# Patient Record
Sex: MALE | Race: WHITE
[De-identification: names, ages, dates, MRNs, and addresses within clinical notes are randomized per-mention and may not be internally consistent; named-entity substitution may affect disease eponyms.]

---

## 2017-10-19 ENCOUNTER — HOSPITAL ENCOUNTER (INPATIENT)
Dept: HOSPITAL 92 - ERS | Age: 62
LOS: 5 days | Discharge: HOME | DRG: 501 | End: 2017-10-24
Attending: HOSPITALIST | Admitting: HOSPITALIST
Payer: COMMERCIAL

## 2017-10-19 VITALS — BODY MASS INDEX: 37.2 KG/M2

## 2017-10-19 DIAGNOSIS — B95.62: ICD-10-CM

## 2017-10-19 DIAGNOSIS — M70.21: Primary | ICD-10-CM

## 2017-10-19 DIAGNOSIS — L03.113: ICD-10-CM

## 2017-10-19 DIAGNOSIS — E66.9: ICD-10-CM

## 2017-10-19 LAB
ALP SERPL-CCNC: 114 U/L (ref 40–150)
ALT SERPL W P-5'-P-CCNC: 16 U/L (ref 8–55)
ANION GAP SERPL CALC-SCNC: 14 MMOL/L (ref 10–20)
AST SERPL-CCNC: 14 U/L (ref 5–34)
BASOPHILS # BLD AUTO: 0 THOU/UL (ref 0–0.2)
BASOPHILS NFR BLD AUTO: 0.3 % (ref 0–1)
BILIRUB SERPL-MCNC: 2 MG/DL (ref 0.2–1.2)
BUN SERPL-MCNC: 10 MG/DL (ref 8.4–25.7)
CALCIUM SERPL-MCNC: 9.1 MG/DL (ref 7.8–10.44)
CHLORIDE SERPL-SCNC: 102 MMOL/L (ref 98–107)
CO2 SERPL-SCNC: 26 MMOL/L (ref 23–31)
CREAT CL PREDICTED SERPL C-G-VRATE: 0 ML/MIN (ref 70–130)
EOSINOPHIL # BLD AUTO: 0.1 THOU/UL (ref 0–0.7)
EOSINOPHIL NFR BLD AUTO: 0.8 % (ref 0–10)
GLOBULIN SER CALC-MCNC: 4.8 G/DL (ref 2.4–3.5)
HCT VFR BLD CALC: 48.8 % (ref 42–52)
LACTATE SERPL-SCNC: 1.8 MMOL/L (ref 0.5–2.2)
LYMPHOCYTES # BLD: 2.3 THOU/UL (ref 1.2–3.4)
LYMPHOCYTES NFR BLD AUTO: 13.7 % (ref 21–51)
MONOCYTES # BLD AUTO: 1.6 THOU/UL (ref 0.11–0.59)
MONOCYTES NFR BLD AUTO: 9.6 % (ref 0–10)
NEUTROPHILS # BLD AUTO: 12.7 THOU/UL (ref 1.4–6.5)
RBC # BLD AUTO: 5.36 MILL/UL (ref 4.7–6.1)
WBC # BLD AUTO: 16.8 THOU/UL (ref 4.8–10.8)

## 2017-10-19 PROCEDURE — 83605 ASSAY OF LACTIC ACID: CPT

## 2017-10-19 PROCEDURE — 87040 BLOOD CULTURE FOR BACTERIA: CPT

## 2017-10-19 PROCEDURE — 80053 COMPREHEN METABOLIC PANEL: CPT

## 2017-10-19 PROCEDURE — 87070 CULTURE OTHR SPECIMN AEROBIC: CPT

## 2017-10-19 PROCEDURE — 36415 COLL VENOUS BLD VENIPUNCTURE: CPT

## 2017-10-19 PROCEDURE — 96367 TX/PROPH/DG ADDL SEQ IV INF: CPT

## 2017-10-19 PROCEDURE — 80048 BASIC METABOLIC PNL TOTAL CA: CPT

## 2017-10-19 PROCEDURE — 81003 URINALYSIS AUTO W/O SCOPE: CPT

## 2017-10-19 PROCEDURE — 36416 COLLJ CAPILLARY BLOOD SPEC: CPT

## 2017-10-19 PROCEDURE — 81015 MICROSCOPIC EXAM OF URINE: CPT

## 2017-10-19 PROCEDURE — 87205 SMEAR GRAM STAIN: CPT

## 2017-10-19 PROCEDURE — 80202 ASSAY OF VANCOMYCIN: CPT

## 2017-10-19 PROCEDURE — A4216 STERILE WATER/SALINE, 10 ML: HCPCS

## 2017-10-19 PROCEDURE — 96366 THER/PROPH/DIAG IV INF ADDON: CPT

## 2017-10-19 PROCEDURE — 86140 C-REACTIVE PROTEIN: CPT

## 2017-10-19 PROCEDURE — 96365 THER/PROPH/DIAG IV INF INIT: CPT

## 2017-10-19 PROCEDURE — 85025 COMPLETE CBC W/AUTO DIFF WBC: CPT

## 2017-10-19 PROCEDURE — 87186 SC STD MICRODIL/AGAR DIL: CPT

## 2017-10-19 PROCEDURE — 87077 CULTURE AEROBIC IDENTIFY: CPT

## 2017-10-19 NOTE — RAD
RIGHT FOREARM TWO VIEWS:

10/19/17

 

HISTORY: 

Cellulitis, swelling of the right forearm and elbow.

 

FINDINGS/IMPRESSION:  

No fracture, dislocation or bony destruction is seen. No periosteal reaction is identified. No radio
paque foreign body is noted. 

 

POS: [facility]

## 2017-10-19 NOTE — RAD
RIGHT ELBOW TWO VIEWS:

10/19/17

 

HISTORY: 

Cut to the right elbow last week, now with swelling, redness, warmth and pain.

 

FINDINGS/IMPRESSION:  

A small olecranon space present. No fracture, dislocation, bony destruction or periosteal reaction i
s identified. No radiopaque foreign body is seen. 

 

POS: [facility]

## 2017-10-19 NOTE — PDOC.EVN
Event Note





- Event Note


Event Note: 





[id number]


h&p dictated


1. Rt upper extremity cellulitis


2. Pain


3. Sepsis


4. H/O Ventral hernia





plan:


see orders

## 2017-10-20 PROCEDURE — 0MB30ZZ EXCISION OF RIGHT ELBOW BURSA AND LIGAMENT, OPEN APPROACH: ICD-10-PCS | Performed by: ORTHOPAEDIC SURGERY

## 2017-10-20 PROCEDURE — 0J9D0ZZ DRAINAGE OF RIGHT UPPER ARM SUBCUTANEOUS TISSUE AND FASCIA, OPEN APPROACH: ICD-10-PCS | Performed by: ORTHOPAEDIC SURGERY

## 2017-10-20 RX ADMIN — Medication SCH ML: at 10:04

## 2017-10-20 RX ADMIN — HEPARIN SODIUM SCH: 5000 INJECTION, SOLUTION INTRAVENOUS; SUBCUTANEOUS at 07:53

## 2017-10-20 RX ADMIN — PIPERACILLIN SODIUM AND TAZOBACTAM SODIUM SCH MLS: 3; .375 INJECTION, POWDER, LYOPHILIZED, FOR SOLUTION INTRAVENOUS at 12:56

## 2017-10-20 RX ADMIN — Medication SCH ML: at 20:47

## 2017-10-20 RX ADMIN — HEPARIN SODIUM SCH: 5000 INJECTION, SOLUTION INTRAVENOUS; SUBCUTANEOUS at 15:53

## 2017-10-20 RX ADMIN — VANCOMYCIN HYDROCHLORIDE SCH MLS: 1 INJECTION, POWDER, LYOPHILIZED, FOR SOLUTION INTRAVENOUS at 14:07

## 2017-10-20 RX ADMIN — PIPERACILLIN SODIUM AND TAZOBACTAM SODIUM SCH MLS: 3; .375 INJECTION, POWDER, LYOPHILIZED, FOR SOLUTION INTRAVENOUS at 20:44

## 2017-10-20 RX ADMIN — PIPERACILLIN SODIUM AND TAZOBACTAM SODIUM SCH MLS: 3; .375 INJECTION, POWDER, LYOPHILIZED, FOR SOLUTION INTRAVENOUS at 08:25

## 2017-10-20 NOTE — OP
PREOPERATIVE DIAGNOSIS:  Abscess in the right arm.

 

POSTOPERATIVE DIAGNOSIS:  Abscess in the right arm and abscess in the right olecranon bursa.

 

PROCEDURES:  Incision and drainage of right arm and right olecranon bursa.

 

SURGEON:  Leland Gray M.D.

 

ANESTHESIA:  General.

 

BLOOD LOSS:  About 50.

 

SPECIMEN:  Culture.

 

COMPLICATIONS:  None.

 

ASSISTANT:  None.

 

DESCRIPTION OF THE PROCEDURE:  The patient was taken to the operating room where general anesthesia 
was induced.  Right arm was prepped and draped in the usual sterile fashion.  Arm was elevated for e
xsanguination.  I made an incision over the posterior arm and extended to the olecranon bursa.  I de
brided the bursa, removed about 10 mL of dense pus, performed a bursectomy of the bursa.  I opened u
p the forearm subcutaneous fascia proximally.  Irrigation was performed.  Tourniquet was released, h
emostasis was obtained.  The wound was packed and open with a single piece of Kerlix and sterile jeanette
ssings applied.  Postoperative plan is for wound VAC.  Cultures were obtained, so we will be able to
 adjust antibiotics based on that and then vancomycin and Zosyn.

## 2017-10-20 NOTE — HP
DATE OF ADMISSION:  10/19/2017

 

CHIEF COMPLAINT:  Right upper extremity pain.

 

HISTORY OF PRESENT ILLNESS:  Patient is a 62 years old male with past medical 
history of hernia, now came to the hospital because of right upper extremity 
swelling.  Patient said, approximately 1 week back, he had elbow injury, had a 
small cut, he cleaned the wound, after that he started noticing right arm 
swelling and erythema associated with some blister and drainage also, swelling 
persisted.  The patient also complains of pain, pain is mild in intensity.  
Complains of fever and chills.  Denies any cough, denies production, denies any 
chest pain, denies any trouble breathing, denies any dizziness.

 

PAST MEDICAL HISTORY:  Hernia.

 

PAST SURGICAL HISTORY:  None.

 

SOCIAL HISTORY:  Denies smoking, denies alcohol, denies any drugs.

 

FAMILY HISTORY:  Denies any heart problems.

 

REVIEW OF SYSTEMS:  Constitutional:  Denies any fever, denies any chills.  Eyes
:  Vision problems.  Ears:  Denies hearing loss.  Neck:  Denies any neck pain.  
Cardiovascular System:  Denies any chest pain, denies any palpations.  
Respiratory System:  Denies any cough, denies production.  Gastrointestinal:  
Denies nausea or vomiting.  Integumentary:  Denies any rash.  Musculoskeletal:  
Positive for right elbow swelling.  Positive for right upper extremity swelling 
and erythema.  Integumentary:  Positive for right upper extremity swelling, 
erythema, and blisters.  Psychiatric:  Mood appropriate this time.  Psychiatric
:  Denies any depression or anxiety.  All other review of systems is reviewed 
and is negative.

 

PHYSICAL EXAMINATION:

CONSTITUTIONAL/VITAL SIGNS:  At the time of H and P performed, blood pressure 
is stable, afebrile, pulse oximetry 97%.

GENERAL:  This patient appears comfortable.

HEENT:  Pupils equal, round, and reactive to light.  Anterior nares patent.  
Nose normal.  Ears normal.  Teeth intact.  Tongue is moist.

NECK:  Supple.  No JVD.

CARDIOVASCULAR SYSTEM:  S1, S2 present.  Regular rate and rhythm.  No murmurs, 
no rubs, no gallops.

RESPIRATORY SYSTEM:  No wheezing and no rhonchi.

MUSCULOSKELETAL:  Right upper extremity swelling present, erythema present, 
drainage present.  Right elbow erythema present, drainage present, warm to 
touch.

CRANIAL NERVE SYSTEM:  Cranial nerves intact.  Follows commands.  Strength 
intact, sensory intact.

PSYCHIATRIC:  Mood appropriate at this time.

 

LABORATORY DATA:  At the time of H and P performed, white count 16.8, 
hemoglobin 16, platelet count is 303.  BMP shows sodium 138, potassium 3.6, 
chloride 102, CO2 26, BUN 10, creatinine 0.82, glucose 100, AST 14, ALT 16, 
alkaline phosphatase 114, and albumin 3.7.

 

ASSESSMENT AND PLAN:  The patient is 62 years old male.

1.  Right upper extremity cellulitis.  Plan to start patient on IV antibiotics.
  Plan to consult Orthopedics to evaluate the elbow also, to rule out septic 
joint.  We will keep the patient n.p.o.  We will start patient on broad-
spectrum antibiotics and we will follow the patient.

2.  Pain.  P.r.n. pain medications.

3.  History of ventral hernia.  Advised the patient to follow up with surgeon 
as an outpatient for surgical evaluation.

4.  Sepsis secondary to cellulitis.  Continue IV antibiotics.  Plan to do blood 
cultures.  Repeat CBC with differential in a.m.

 

The case was discussed in detail with the patient.  Patient is FULL CODE.

 

MTDD

## 2017-10-20 NOTE — CON
DATE OF CONSULTATION:  10/20/2017

 

REQUESTING PHYSICIAN:  Dr. Sawyer Oh 

 

CONSULTING PHYSICIAN:  Dr. Leland Gray

 

CHIEF COMPLAINT:  Right arm septic olecranon bursitis.

 

HISTORY OF PRESENT ILLNESS:  Kaiden is a 62-year-old white male who was admitted to the emergency lisa
 yesterday evening after he presented as a walk-in for amplified pain, swelling, and redness of the
 right elbow after he bumped it approximately 5 days ago.  He treated with essentially benign neglec
t for the prior 4-5 days until pain amplification prompted him to come to the ER.  He was admitted b
y the Medicine Team and our service was consulted for evaluation of presumed septic olecranon bursit
is with resultant leukocytosis.  

 

PAST MEDICAL HISTORY:  Negative with the exception of chronic morbid obesity.

 

PAST SURGICAL HISTORY:  Noncontributory.

 

PHYSICAL EXAMINATION:

VITAL SIGNS:  Temperature 98.3, pulse 76, respiration rate 18, O2 saturation 98% on room air, blood 
pressure 129/74.

GENERAL:  He is alert and oriented to person, place, time, and situation, responds appropriately to 
examiner.  Grossly nonfocal.

EXTREMITIES:  Visual inspection of the right upper extremity demonstrates him to have cellulitis on 
the posterior aspect of the brachium extending over the elbow posteriorly and down the forearm with 
circumferential swelling appreciated consistent with inflammatory edema and cellulitis.  Range of mo
tion of the elbows is uninhibited non-provocative for pain with the exception of olecranon discomfor
t.  Fluctuance is appreciated with palpation and pressure on the blistered olecranon itself and it a
ppears to be grossly infected.  Skin blistering is also noted and a little bit of weepage is appreci
ated.

 

IMPRESSION:

1.  Septic olecranon bursitis, right elbow.

2.  Cellulitis secondary to #1.

3.  Leukocytosis secondary to #1 and 2.

 

PLAN:

1.  N.p.o.

2.  The risks, benefits, options, alternatives, and rationale for proceeding with incision and drain
age, washout, exploration of the right elbow olecranon has been explained in great detail with the p
atient.  He is ready to proceed.  All questions were answered.  No guarantee of outcome has been sta
tisha or implied.

3.  Please see orders.

## 2017-10-20 NOTE — PDOC.PN
- Subjective


Encounter Start Date: 10/20/17


Encounter Start Time: 09:45


-: old records requested/rev





Patient seen and examined. No overnight events, no fever, has pain at right 

elbow





- Objective


MAR Reviewed: Yes


Vital Signs & Weight: 


 Vital Signs (12 hours)











  Temp Pulse Resp BP Pulse Ox


 


 10/20/17 08:00  98.5 F  88  22 H  129/81  97


 


 10/20/17 04:00  98.3 F  76  18  129/74  98


 


 10/20/17 01:33  98.4 F  84  20  105/79  97








 Weight











Weight                         290 lb














Result Diagrams: 


 10/19/17 21:29





 10/19/17 21:30


Radiology Reviewed by me: Yes





Phys Exam





- Physical Examination


Constitutional: NAD


HEENT: PERRLA, moist MMs, sclera anicteric


Neck: no JVD, supple


Respiratory: no wheezing, no rales, no rhonchi


Cardiovascular: RRR, no significant murmur, no rub


Gastrointestinal: soft, non-tender, no distention, positive bowel sounds


Musculoskeletal: no edema, pulses present


right UE cellulitis, bursitis


Neurological: non-focal, normal sensation, moves all 4 limbs


Lymphatic: no nodes


Psychiatric: normal affect, A&O x 3


Skin: no rash, normal turgor





Dx/Plan


(1) Olecranon bursitis, right elbow


Code(s): M70.21 - OLECRANON BURSITIS, RIGHT ELBOW   Status: Acute   





(2) Cellulitis of right upper extremity


Code(s): L03.113 - CELLULITIS OF RIGHT UPPER LIMB   Status: Acute   





(3) Obesity (BMI 30-39.9)


Code(s): E66.9 - OBESITY, UNSPECIFIED   Status: Chronic   





- Plan


cont current plan of care, continue antibiotics





* ortho planning to to I & D


* medication reviewed as below


* symptomatic treatment


* continue IV vancomycin and zosyn


* pain control


* post op wound care.








Review of Systems





- Review of Systems


ENT: negative: Ear Pain, Ear Discharge, Nose Pain, Nose Discharge, Nose 

Congestion, Mouth Pain, Mouth Swelling, Throat Pain, Throat Swelling, Other


Respiratory: negative: Cough, Dry, Shortness of Breath, Hemoptysis, SOB with 

Excertion, Pleuritic Pain, Sputum, Wheezing


Cardiovascular: negative: Chest Pain, Palpitations, Orthopnea, Paroxysmal Noc. 

Dyspnea, Edema, Light Headedness, Other


Gastrointestinal: negative: Nausea, Vomiting, Abdominal Pain, Diarrhea, 

Constipation, Melena, Hematochezia, Other


Genitourinary: negative: Dysuria, Frequency, Incontinence, Hematuria, Retention

, Other


Musculoskeletal: Arm Pain.  negative: Neck Pain, Shoulder Pain, Back Pain, Hand 

Pain, Leg Pain, Foot Pain, Other


Skin: negative: Rash, Lesions, Jann, Bruising, Other





- Medications/Allergies


Allergies/Adverse Reactions: 


 Allergies











Allergy/AdvReac Type Severity Reaction Status Date / Time


 


No Known Drug Allergies Allergy   Verified 10/20/17 08:15











Medications: 


 Current Medications





Acetaminophen (Tylenol)  650 mg PO Q4H PRN


   PRN Reason: Headache/Fever or Pain


Hydrocodone Bitart/Acetaminophen (Norco 5/325)  1 tab PO Q4H PRN


   PRN Reason: Moderate Pain (4-6)


Hydrocodone Bitart/Acetaminophen (Norco 10/325)  1 tab PO Q4H PRN


   PRN Reason: Mild Pain (1-3)


Hydrocodone Bitart/Acetaminophen (Norco 10/325)  2 tab PO Q4H PRN


   PRN Reason: Moderate Pain (4-6)


Al Hydroxide/Mg Hydroxide (Maalox)  15 ml PO Q4H PRN


   PRN Reason: Heartburn  or Indigestion


Artificial Tears (Tears Naturale)  0 drop EA EYE PRN PRN


   PRN Reason: Dry Eyes


Enoxaparin Sodium (Lovenox)  40 mg SC 0900 [facility]


Enoxaparin Sodium (Lovenox)  40 mg SC 2100 [facility]


Famotidine (Pepcid)  20 mg PO BID [facility]


Fentanyl (Sublimaze)  50 mcg SLOW IVP Q1H PRN


   PRN Reason: Moderate to Severe Pain (6-10)


Fentanyl (Sublimaze)  100 mcg SLOW IVP Q1H PRN


   PRN Reason: Moderate to Severe Pain (6-10)


Fentanyl (Pacu-Sublimaze)  50 mcg SLOW IVP Q10MIN PRN


   PRN Reason: Moderate to Severe Pain (6-10)


   Stop: 10/20/17 14:16


Guaifenesin (Robitussin Sf)  200 mg PO Q4H PRN


   PRN Reason: Cough


Heparin Sodium (Porcine) (Heparin)  5,000 units SC TID [facility]


   Last Admin: 10/20/17 07:53 Dose:  Not Given


Hydralazine HCl (Apresoline)  10 mg SLOW IVP Q4H PRN


   PRN Reason: Systolic BP > 180


Sodium Chloride (Normal Saline 0.9%)  1,000 mls @ 100 mls/hr IV .Q10H [facility]


   Last Admin: 10/20/17 02:14 Dose:  1,000 mls


Vancomycin HCl 1.5 gm/ Sodium (Chloride)  300 mls @ 200 mls/hr IVPB 1200,2359 

[facility]


Piperacillin Sod/Tazobactam Sod 3.375 gm/ Miscellaneous Medication 1 each/ 

Sodium Chloride  100 mls @ 200 mls/hr IVPB 0200,0800,1400,2000 [facility]


   Last Admin: 10/20/17 08:25 Dose:  100 mls


Influenza Virus Vaccine (Fluzone Quad [id number] Syringe)  0.5 ml IM .ONCE ONE


   Stop: 10/21/17 09:01


Loperamide HCl (Imodium)  2 mg PO PRN PRN


   PRN Reason: Diarrhea/Loose Stools


Loratadine (Claritin)  10 mg PO DAILYPRN PRN


   PRN Reason: Sinus Symptoms


Magnesium Hydroxide (Milk Of Magnesium)  30 ml PO DAILYPRN PRN


   PRN Reason: Constipation


Mineral Oil/White Petrolatum (Eucerin Cream)  0 gm TOP BIDPRN PRN


   PRN Reason: Dry Skin


Morphine Sulfate (Morphine Sulfate)  2 mg SLOW IVP Q4H PRN


   PRN Reason: Moderate Pain (4-6)


Ondansetron HCl (Zofran)  4 mg IVP Q6H PRN


   PRN Reason: Nausea/Vomiting


Ondansetron HCl (Zofran Odt)  4 mg PO Q6H PRN


   PRN Reason: Nausea/Vomiting


Ondansetron HCl (Pacu-Zofran)  4 mg IVP ONE PRN


   PRN Reason: Nausea/Vomiting


   Stop: 10/20/17 14:16


Phenol (Chloraseptic Spray 180 Ml Bot)  0 ml PO PRN PRN


   PRN Reason: Sore Throat


Promethazine HCl (Pacu-Phenergan)  6.25 mg SLOW IVP ONE PRN


   PRN Reason: Nausea/Vomiting


   Stop: 10/20/17 14:16


Promethazine HCl (Pacu-Phenergan)  6.25 mg IM ONE PRN


   PRN Reason: Nausea/Vomiting


   Stop: 10/20/17 14:16


Senna (Senokot)  2 tab PO HSPRN PRN


   PRN Reason: Constipation


Sodium Chloride (Flush - Normal Saline)  10 ml IVF Q12HR [facility]


   Last Admin: 10/20/17 10:04 Dose:  10 ml


Sodium Chloride (Flush - Normal Saline)  10 ml IVF PRN PRN


   PRN Reason: Saline Flush


Sodium Chloride (Ocean Nasal Spray 0.65%)  0 ml EA NARE QIDPRN PRN


   PRN Reason: Nasal Congestion


Temazepam (Restoril)  15 mg PO HSPRN PRN


   PRN Reason: Insomnia

## 2017-10-20 NOTE — ULT
PRELIMINARY REPORT/[facility]/EMERGENCY AFTER

HOURS PROCEDURE:

 

EXAM:

US Duplex Right Upper Extremity Veins

 

CLINICAL HISTORY:

62 years old, male; Pain and signs and symptoms; Edema, localized and other: Redness; Upper extremit
y, right; Other: Elbow

 

TECHNIQUE:

Real-time ultrasound scan of the veins of the right upper extremity with color Doppler flow, spectra
l waveform analysis and compression.

 

COMPARISON:

No relevant prior studies available.

 

FINDINGS:

Evaluated veins include the internal jugular, subclavian, axillary, brachial, basilic, cephalic, rad
ial, and ulnar veins.

No visible clot in the included veins.

The included veins appear normally compressible.

Duplex Doppler evaluation demonstrates flow in the evaluated veins.

 

IMPRESSION:

No evidence of acute right upper extremity DVT.

 

Thank you for allowing us to participate in the care of your patient.

 

Dictated and Authenticated by: Huber Marsh MD

10/20/2017 12:25 AM Central Time (US \T\ [place])

 

 

 

 

 

 

                          FINAL REPORT

 

EMERGENT AFTER HOURS RIGHT UPPER EXTREMITY VENOUS DOPPLER WITH SPECTRAL ANALYSIS AND COLOR FLOW EVAL
UATION:

10/20/2017

 

HISTORY:

Right upper extremity pain.

 

IMPRESSION:   

Findings are in agreement with the preliminary report by Ernesto.  There is no  evidence of a DVT invol
ving the visualized deep venous structures of the right upper extremity.

 

POS: [facility]

## 2017-10-21 LAB
ANION GAP SERPL CALC-SCNC: 13 MMOL/L (ref 10–20)
BASOPHILS # BLD AUTO: 0 THOU/UL (ref 0–0.2)
BASOPHILS NFR BLD AUTO: 0.1 % (ref 0–1)
BUN SERPL-MCNC: 14 MG/DL (ref 8.4–25.7)
CALCIUM SERPL-MCNC: 8.7 MG/DL (ref 7.8–10.44)
CHLORIDE SERPL-SCNC: 108 MMOL/L (ref 98–107)
CO2 SERPL-SCNC: 21 MMOL/L (ref 23–31)
CREAT CL PREDICTED SERPL C-G-VRATE: 167 ML/MIN (ref 70–130)
EOSINOPHIL # BLD AUTO: 0.1 THOU/UL (ref 0–0.7)
EOSINOPHIL NFR BLD AUTO: 0.4 % (ref 0–10)
HCT VFR BLD CALC: 45 % (ref 42–52)
LYMPHOCYTES # BLD: 1.4 THOU/UL (ref 1.2–3.4)
LYMPHOCYTES NFR BLD AUTO: 8.5 % (ref 21–51)
MONOCYTES # BLD AUTO: 1.1 THOU/UL (ref 0.11–0.59)
MONOCYTES NFR BLD AUTO: 6.7 % (ref 0–10)
NEUTROPHILS # BLD AUTO: 13.3 THOU/UL (ref 1.4–6.5)
RBC # BLD AUTO: 4.95 MILL/UL (ref 4.7–6.1)
VANCOMYCIN TROUGH SERPL-MCNC: (no result) UG/ML
WBC # BLD AUTO: 15.8 THOU/UL (ref 4.8–10.8)

## 2017-10-21 RX ADMIN — PIPERACILLIN SODIUM AND TAZOBACTAM SODIUM SCH MLS: 3; .375 INJECTION, POWDER, LYOPHILIZED, FOR SOLUTION INTRAVENOUS at 07:53

## 2017-10-21 RX ADMIN — VANCOMYCIN HYDROCHLORIDE SCH MLS: 1 INJECTION, POWDER, LYOPHILIZED, FOR SOLUTION INTRAVENOUS at 00:15

## 2017-10-21 RX ADMIN — Medication SCH: at 20:06

## 2017-10-21 RX ADMIN — PIPERACILLIN SODIUM AND TAZOBACTAM SODIUM SCH MLS: 3; .375 INJECTION, POWDER, LYOPHILIZED, FOR SOLUTION INTRAVENOUS at 14:24

## 2017-10-21 RX ADMIN — PIPERACILLIN SODIUM AND TAZOBACTAM SODIUM SCH MLS: 3; .375 INJECTION, POWDER, LYOPHILIZED, FOR SOLUTION INTRAVENOUS at 02:43

## 2017-10-21 RX ADMIN — Medication SCH ML: at 07:59

## 2017-10-21 RX ADMIN — HYDROCODONE BITARTRATE AND ACETAMINOPHEN PRN TAB: 10; 325 TABLET ORAL at 08:44

## 2017-10-21 RX ADMIN — VANCOMYCIN HYDROCHLORIDE SCH MLS: 1 INJECTION, POWDER, LYOPHILIZED, FOR SOLUTION INTRAVENOUS at 12:06

## 2017-10-21 RX ADMIN — PIPERACILLIN SODIUM AND TAZOBACTAM SODIUM SCH MLS: 3; .375 INJECTION, POWDER, LYOPHILIZED, FOR SOLUTION INTRAVENOUS at 19:34

## 2017-10-21 NOTE — PDOC.PN
- Subjective


Encounter Start Date: 10/21/17


Encounter Start Time: 09:50





Patient seen and examined. No new complaints. No overnight events





- Objective


MAR Reviewed: Yes


Vital Signs & Weight: 


 Vital Signs (12 hours)











  Temp Pulse Resp BP Pulse Ox


 


 10/21/17 08:00  98.2 F  91  16  


 


 10/21/17 07:26  98.2 F  91  16  138/82  91 L


 


 10/21/17 04:00  98.0 F  88  16  143/80 H  96


 


 10/21/17 00:00  98.1 F  82  14  137/77  94 L








 Weight











Admit Weight                   290 lb


 


Weight                         290 lb














I&O: 


 











 10/20/17 10/21/17 10/22/17





 06:59 06:59 06:59


 


Intake Total  3980 480


 


Balance  3980 480











Result Diagrams: 


 10/21/17 04:48





 10/21/17 04:48





Phys Exam





- Physical Examination


Constitutional: NAD


HEENT: PERRLA, moist MMs, sclera anicteric


Neck: no JVD, supple


Respiratory: no wheezing, no rales, no rhonchi


Cardiovascular: RRR, no significant murmur, no rub


Gastrointestinal: soft, non-tender, no distention, positive bowel sounds


Musculoskeletal: no edema, pulses present


right elbow with dressing and wound vac


Neurological: non-focal, normal sensation, moves all 4 limbs


Psychiatric: normal affect, A&O x 3


Skin: no rash, normal turgor





Dx/Plan


(1) Olecranon bursitis, right elbow


Code(s): M70.21 - OLECRANON BURSITIS, RIGHT ELBOW   Status: Acute   





(2) Cellulitis of right upper extremity


Code(s): L03.113 - CELLULITIS OF RIGHT UPPER LIMB   Status: Acute   





(3) Obesity (BMI 30-39.9)


Code(s): E66.9 - OBESITY, UNSPECIFIED   Status: Chronic   





- Plan


cont current plan of care, continue antibiotics





* continue wound care with wound vac.


* follow culture


* continue vancomycin and zosyn


* medication reviewed as below


* symptomatic treatment


* pain controlled








Review of Systems





- Review of Systems


ENT: negative: Ear Pain, Ear Discharge, Nose Pain, Nose Discharge, Nose 

Congestion, Mouth Pain, Mouth Swelling, Throat Pain, Throat Swelling, Other


Respiratory: negative: Cough, Dry, Shortness of Breath, Hemoptysis, SOB with 

Excertion, Pleuritic Pain, Sputum, Wheezing


Cardiovascular: negative: Chest Pain, Palpitations, Orthopnea, Paroxysmal Noc. 

Dyspnea, Edema, Light Headedness, Other


Gastrointestinal: negative: Nausea, Vomiting, Abdominal Pain, Diarrhea, 

Constipation, Melena, Hematochezia, Other


Genitourinary: negative: Dysuria, Frequency, Incontinence, Hematuria, Retention

, Other


Musculoskeletal: negative: Neck Pain, Shoulder Pain, Arm Pain, Back Pain, Hand 

Pain, Leg Pain, Foot Pain, Other





- Medications/Allergies


Allergies/Adverse Reactions: 


 Allergies











Allergy/AdvReac Type Severity Reaction Status Date / Time


 


No Known Drug Allergies Allergy   Verified 10/20/17 08:15











Medications: 


 Current Medications





Acetaminophen (Tylenol)  650 mg PO Q4H PRN


   PRN Reason: Headache/Fever or Pain


Hydrocodone Bitart/Acetaminophen (Norco 5/325)  1 tab PO Q4H PRN


   PRN Reason: Moderate Pain (4-6)


Hydrocodone Bitart/Acetaminophen (Norco 10/325)  1 tab PO Q4H PRN


   PRN Reason: Mild Pain (1-3)


Hydrocodone Bitart/Acetaminophen (Norco 10/325)  2 tab PO Q4H PRN


   PRN Reason: Moderate Pain (4-6)


   Last Admin: 10/21/17 08:44 Dose:  2 tab


Al Hydroxide/Mg Hydroxide (Maalox)  15 ml PO Q4H PRN


   PRN Reason: Heartburn  or Indigestion


Artificial Tears (Tears Naturale)  0 drop EA EYE PRN PRN


   PRN Reason: Dry Eyes


Enoxaparin Sodium (Lovenox)  40 mg SC 0900 [facility]


   Last Admin: 10/21/17 07:59 Dose:  40 mg


Enoxaparin Sodium (Lovenox)  40 mg SC 2100 [facility]


   Last Admin: 10/20/17 20:46 Dose:  40 mg


Famotidine (Pepcid)  20 mg PO BID [facility]


   Last Admin: 10/21/17 07:58 Dose:  20 mg


Fentanyl (Sublimaze)  50 mcg SLOW IVP Q1H PRN


   PRN Reason: Moderate to Severe Pain (6-10)


Fentanyl (Sublimaze)  100 mcg SLOW IVP Q1H PRN


   PRN Reason: Moderate to Severe Pain (6-10)


Guaifenesin (Robitussin Sf)  200 mg PO Q4H PRN


   PRN Reason: Cough


Hydralazine HCl (Apresoline)  10 mg SLOW IVP Q4H PRN


   PRN Reason: Systolic BP > 180


Sodium Chloride (Normal Saline 0.9%)  1,000 mls @ 100 mls/hr IV .Q10H [facility]


   Last Admin: 10/21/17 05:57 Dose:  1,000 mls


Vancomycin HCl 1.5 gm/ Sodium (Chloride)  300 mls @ 200 mls/hr IVPB 1200,2359 

[facility]


   Last Admin: 10/21/17 00:15 Dose:  300 mls


Piperacillin Sod/Tazobactam Sod 3.375 gm/ Miscellaneous Medication 1 each/ 

Sodium Chloride  100 mls @ 200 mls/hr IVPB 0200,0800,1400,2000 [facility]


   Last Admin: 10/21/17 07:53 Dose:  100 mls


Loperamide HCl (Imodium)  2 mg PO PRN PRN


   PRN Reason: Diarrhea/Loose Stools


Loratadine (Claritin)  10 mg PO DAILYPRN PRN


   PRN Reason: Sinus Symptoms


Magnesium Hydroxide (Milk Of Magnesium)  30 ml PO DAILYPRN PRN


   PRN Reason: Constipation


Mineral Oil/White Petrolatum (Eucerin Cream)  0 gm TOP BIDPRN PRN


   PRN Reason: Dry Skin


Morphine Sulfate (Morphine Sulfate)  2 mg SLOW IVP Q4H PRN


   PRN Reason: Moderate Pain (4-6)


Ondansetron HCl (Zofran)  4 mg IVP Q6H PRN


   PRN Reason: Nausea/Vomiting


Ondansetron HCl (Zofran Odt)  4 mg PO Q6H PRN


   PRN Reason: Nausea/Vomiting


Phenol (Chloraseptic Spray 180 Ml Bot)  0 ml PO PRN PRN


   PRN Reason: Sore Throat


Senna (Senokot)  2 tab PO HSPRN PRN


   PRN Reason: Constipation


Sodium Chloride (Flush - Normal Saline)  10 ml IVF Q12HR [facility]


   Last Admin: 10/21/17 07:59 Dose:  10 ml


Sodium Chloride (Flush - Normal Saline)  10 ml IVF PRN PRN


   PRN Reason: Saline Flush


Sodium Chloride (Ocean Nasal Spray 0.65%)  0 ml EA NARE QIDPRN PRN


   PRN Reason: Nasal Congestion


Temazepam (Restoril)  15 mg PO HSPRN PRN


   PRN Reason: Insomnia

## 2017-10-22 LAB — VANCOMYCIN TROUGH SERPL-MCNC: 22.7 UG/ML

## 2017-10-22 RX ADMIN — Medication SCH: at 20:02

## 2017-10-22 RX ADMIN — Medication SCH ML: at 08:44

## 2017-10-22 RX ADMIN — PIPERACILLIN SODIUM AND TAZOBACTAM SODIUM SCH MLS: 3; .375 INJECTION, POWDER, LYOPHILIZED, FOR SOLUTION INTRAVENOUS at 02:10

## 2017-10-22 RX ADMIN — VANCOMYCIN HYDROCHLORIDE SCH MLS: 1 INJECTION, POWDER, LYOPHILIZED, FOR SOLUTION INTRAVENOUS at 11:32

## 2017-10-22 RX ADMIN — VANCOMYCIN HYDROCHLORIDE SCH MLS: 1 INJECTION, POWDER, LYOPHILIZED, FOR SOLUTION INTRAVENOUS at 04:17

## 2017-10-22 NOTE — PDOC.PN
- Subjective


Encounter Start Date: 10/22/17


Encounter Start Time: 08:00





pt did not tolerate morphin well, made not to feel good, no fever





- Objective


MAR Reviewed: Yes


Vital Signs & Weight: 


 Vital Signs (12 hours)











  Temp Pulse Resp BP Pulse Ox


 


 10/22/17 11:45  97.8 F  87  18  169/90 H  97


 


 10/22/17 08:08  98.5 F  85  20  169/92 H  96


 


 10/22/17 08:00  98.5 F  85  20  


 


 10/22/17 02:30   85  22 H  151/92 H 








 Weight











Admit Weight                   290 lb


 


Weight                         290 lb














I&O: 


 











 10/21/17 10/22/17 10/23/17





 06:59 06:59 06:59


 


Intake Total 3980 3050 240


 


Output Total  200 


 


Balance 3980 2850 240











Result Diagrams: 


 10/21/17 04:48





 10/21/17 04:48





Phys Exam





- Physical Examination


Constitutional: NAD


HEENT: PERRLA, moist MMs, sclera anicteric


Neck: no JVD, supple


Respiratory: no wheezing, no rales, no rhonchi


Cardiovascular: RRR, no significant murmur, no rub


Gastrointestinal: soft, non-tender, no distention, positive bowel sounds


Musculoskeletal: no edema, pulses present


right UE with dressing and wound vac in place


Neurological: non-focal, normal sensation, moves all 4 limbs


Lymphatic: no nodes


Psychiatric: normal affect, A&O x 3


Skin: no rash, normal turgor





Dx/Plan


(1) Olecranon bursitis, right elbow


Code(s): M70.21 - OLECRANON BURSITIS, RIGHT ELBOW   Status: Acute   





(2) Cellulitis of right upper extremity


Code(s): L03.113 - CELLULITIS OF RIGHT UPPER LIMB   Status: Acute   





(3) Obesity (BMI 30-39.9)


Code(s): E66.9 - OBESITY, UNSPECIFIED   Status: Chronic   





- Plan


cont current plan of care, continue antibiotics





* as culture is positive for MRSA, will dc Zosyn


* continue vancomycin


* wound care with wound vac


* ortho to decide about need for wound vac.


* pain control


* will add toradol








Review of Systems





- Review of Systems


ENT: negative: Ear Pain, Ear Discharge, Nose Pain, Nose Discharge, Nose 

Congestion, Mouth Pain, Mouth Swelling, Throat Pain, Throat Swelling, Other


Respiratory: negative: Cough, Dry, Shortness of Breath, Hemoptysis, SOB with 

Excertion, Pleuritic Pain, Sputum, Wheezing


Cardiovascular: negative: Chest Pain, Palpitations, Orthopnea, Paroxysmal Noc. 

Dyspnea, Edema, Light Headedness, Other


Gastrointestinal: negative: Nausea, Vomiting, Abdominal Pain, Diarrhea, 

Constipation, Melena, Hematochezia, Other


Genitourinary: negative: Dysuria, Frequency, Incontinence, Hematuria, Retention

, Other


Musculoskeletal: negative: Neck Pain, Shoulder Pain, Arm Pain, Back Pain, Hand 

Pain, Leg Pain, Foot Pain, Other





- Medications/Allergies


Allergies/Adverse Reactions: 


 Allergies











Allergy/AdvReac Type Severity Reaction Status Date / Time


 


No Known Drug Allergies Allergy   Verified 10/20/17 08:15











Medications: 


 Current Medications





Acetaminophen (Tylenol)  650 mg PO Q4H PRN


   PRN Reason: Headache/Fever or Pain


   Last Admin: 10/22/17 08:43 Dose:  650 mg


Hydrocodone Bitart/Acetaminophen (Norco 5/325)  1 tab PO Q4H PRN


   PRN Reason: Moderate Pain (4-6)


Hydrocodone Bitart/Acetaminophen (Norco 10/325)  1 tab PO Q4H PRN


   PRN Reason: Mild Pain (1-3)


Hydrocodone Bitart/Acetaminophen (Norco 10/325)  2 tab PO Q4H PRN


   PRN Reason: Moderate Pain (4-6)


   Last Admin: 10/21/17 08:44 Dose:  2 tab


Al Hydroxide/Mg Hydroxide (Maalox)  15 ml PO Q4H PRN


   PRN Reason: Heartburn  or Indigestion


Artificial Tears (Tears Naturale)  0 drop EA EYE PRN PRN


   PRN Reason: Dry Eyes


Enoxaparin Sodium (Lovenox)  40 mg SC 0900 [facility]


   Last Admin: 10/22/17 08:44 Dose:  40 mg


Enoxaparin Sodium (Lovenox)  40 mg SC 2100 [facility]


   Last Admin: 10/21/17 20:05 Dose:  40 mg


Famotidine (Pepcid)  20 mg PO BID [facility]


   Last Admin: 10/22/17 08:44 Dose:  20 mg


Fentanyl (Sublimaze)  50 mcg SLOW IVP Q1H PRN


   PRN Reason: Moderate to Severe Pain (6-10)


Fentanyl (Sublimaze)  100 mcg SLOW IVP Q1H PRN


   PRN Reason: Moderate to Severe Pain (6-10)


Guaifenesin (Robitussin Sf)  200 mg PO Q4H PRN


   PRN Reason: Cough


Hydralazine HCl (Apresoline)  10 mg SLOW IVP Q4H PRN


   PRN Reason: Systolic BP > 180


Sodium Chloride (Normal Saline 0.9%)  1,000 mls @ 100 mls/hr IV .Q10H [facility]


   Last Admin: 10/22/17 04:26 Dose:  1,000 mls


Vancomycin HCl 1.5 gm/ Sodium (Chloride)  300 mls @ 200 mls/hr IVPB 0400,1200,

2000 [facility]


   Last Admin: 10/22/17 11:32 Dose:  300 mls


Loperamide HCl (Imodium)  2 mg PO PRN PRN


   PRN Reason: Diarrhea/Loose Stools


Loratadine (Claritin)  10 mg PO DAILYPRN PRN


   PRN Reason: Sinus Symptoms


Magnesium Hydroxide (Milk Of Magnesium)  30 ml PO DAILYPRN PRN


   PRN Reason: Constipation


Mineral Oil/White Petrolatum (Eucerin Cream)  0 gm TOP BIDPRN PRN


   PRN Reason: Dry Skin


Morphine Sulfate (Morphine Sulfate)  2 mg SLOW IVP Q4H PRN


   PRN Reason: Moderate Pain (4-6)


   Last Admin: 10/22/17 02:14 Dose:  2 mg


Ondansetron HCl (Zofran)  4 mg IVP Q6H PRN


   PRN Reason: Nausea/Vomiting


Ondansetron HCl (Zofran Odt)  4 mg PO Q6H PRN


   PRN Reason: Nausea/Vomiting


Phenol (Chloraseptic Spray 180 Ml Bot)  0 ml PO PRN PRN


   PRN Reason: Sore Throat


Senna (Senokot)  2 tab PO HSPRN PRN


   PRN Reason: Constipation


Sodium Chloride (Flush - Normal Saline)  10 ml IVF Q12HR [facility]


   Last Admin: 10/22/17 08:44 Dose:  10 ml


Sodium Chloride (Flush - Normal Saline)  10 ml IVF PRN PRN


   PRN Reason: Saline Flush


Sodium Chloride (Ocean Nasal Spray 0.65%)  0 ml EA NARE QIDPRN PRN


   PRN Reason: Nasal Congestion


Temazepam (Restoril)  15 mg PO HSPRN PRN


   PRN Reason: Insomnia

## 2017-10-23 VITALS — TEMPERATURE: 98 F

## 2017-10-23 LAB
ANION GAP SERPL CALC-SCNC: 10 MMOL/L (ref 10–20)
BASOPHILS # BLD AUTO: 0.1 THOU/UL (ref 0–0.2)
BASOPHILS NFR BLD AUTO: 0.7 % (ref 0–1)
BUN SERPL-MCNC: 14 MG/DL (ref 8.4–25.7)
CALCIUM SERPL-MCNC: 8.9 MG/DL (ref 7.8–10.44)
CHLORIDE SERPL-SCNC: 108 MMOL/L (ref 98–107)
CO2 SERPL-SCNC: 25 MMOL/L (ref 23–31)
CREAT CL PREDICTED SERPL C-G-VRATE: 185 ML/MIN (ref 70–130)
EOSINOPHIL # BLD AUTO: 0.4 THOU/UL (ref 0–0.7)
EOSINOPHIL NFR BLD AUTO: 4.5 % (ref 0–10)
HCT VFR BLD CALC: 46.2 % (ref 42–52)
LYMPHOCYTES # BLD: 1.9 THOU/UL (ref 1.2–3.4)
LYMPHOCYTES NFR BLD AUTO: 19.2 % (ref 21–51)
MONOCYTES # BLD AUTO: 1.1 THOU/UL (ref 0.11–0.59)
MONOCYTES NFR BLD AUTO: 11.5 % (ref 0–10)
NEUTROPHILS # BLD AUTO: 6.4 THOU/UL (ref 1.4–6.5)
RBC # BLD AUTO: 5.02 MILL/UL (ref 4.7–6.1)
WBC # BLD AUTO: 9.9 THOU/UL (ref 4.8–10.8)

## 2017-10-23 RX ADMIN — Medication SCH: at 09:01

## 2017-10-23 RX ADMIN — HYDROCODONE BITARTRATE AND ACETAMINOPHEN PRN TAB: 10; 325 TABLET ORAL at 20:51

## 2017-10-23 RX ADMIN — HYDROCODONE BITARTRATE AND ACETAMINOPHEN PRN TAB: 10; 325 TABLET ORAL at 11:58

## 2017-10-23 RX ADMIN — Medication SCH ML: at 20:55

## 2017-10-23 NOTE — PDOC.PN
- Subjective


Encounter Start Date: 10/23/17


Encounter Start Time: 10:32


Subjective: feels well.does c/o urinary hesitancy and frequency





- Objective


MAR Reviewed: Yes


Vital Signs & Weight: 


 Vital Signs (12 hours)











  Temp Pulse Resp BP Pulse Ox


 


 10/23/17 08:00  98 F  78  18  160/92 H  95


 


 10/23/17 07:30  98 F  78  18  








 Weight











Admit Weight                   290 lb


 


Weight                         290 lb














I&O: 


 











 10/22/17 10/23/17 10/24/17





 06:59 06:59 06:59


 


Intake Total 3050 4585 


 


Output Total 200  


 


Balance 2850 4585 











Result Diagrams: 


 10/23/17 05:09





 10/23/17 05:09


Additional Labs: 





Microbiology





10/20/17 10:40   Elbow - Wound   Bacterial Culture - Final


10/20/17 10:40   Elbow - Wound   Anaerobic Culture - Final


                              Methicillin resistant S.aureus


10/19/17 21:43   Venous blood - Left Arm   Blood Culture - Preliminary


                              NO GROWTH AT 48 HOURS


10/19/17 21:30   Venous blood - Left Hand   Blood Culture - Preliminary


                              NO GROWTH AT 48 HOURS





 Laboratory Tests











  10/19/17 10/21/17 10/21/17





  21:29 04:48 04:48


 


WBC  16.8 H   15.8 H


 


C-Reactive Protein   15.23 H 














  10/23/17 10/23/17





  05:09 05:09


 


WBC   9.9


 


C-Reactive Protein  2.89 H 














Phys Exam





- Physical Examination


Constitutional: NAD


HEENT: PERRLA, moist MMs, sclera anicteric, oral pharynx no lesions


Neck: no nodes, no JVD, supple, full ROM


Respiratory: no wheezing, no rales, no rhonchi, clear to auscultation bilateral


Cardiovascular: RRR, no significant murmur, no rub, gallop


Gastrointestinal: soft, non-tender, no distention, positive bowel sounds


Musculoskeletal: no edema, pulses present


Left elbow wound vac


Neurological: non-focal, normal sensation, moves all 4 limbs


Psychiatric: normal affect, A&O x 3


Skin: no rash





Dx/Plan


(1) Cellulitis of right upper extremity


Code(s): L03.113 - CELLULITIS OF RIGHT UPPER LIMB   Status: Acute   





(2) Olecranon bursitis, right elbow


Code(s): M70.21 - OLECRANON BURSITIS, RIGHT ELBOW   Status: Acute   





(3) Obesity (BMI 30-39.9)


Code(s): E66.9 - OBESITY, UNSPECIFIED   Status: Chronic   





- Plan


continue antibiotics, out of bed/ambulate, DVT proph w/SCDs


MRSA on Cx. on Vancomycin.appreciate ID consult.


-: will go home on Po ABx. Orthopedics to decide about wound vac


-: hemodynamically stable.


-: OK to Dc once wound vac arranged.


-: check UA.Post-Void residual.likley BPH.may benefit form Flomax





* .








Review of Systems





- Review of Systems


Constitutional: negative: Fever, Chills, Sweats, Weakness, Malaise, Other


Eyes: negative: Pain, Vision Change, Conjunctivae Inflammation, Eyelid 

Inflammation, Redness, Other


Cardiovascular: negative: Chest Pain, Palpitations, Orthopnea, Paroxysmal Noc. 

Dyspnea, Edema, Light Headedness, Other


Gastrointestinal: negative: Nausea, Vomiting, Abdominal Pain, Diarrhea, 

Constipation, Melena, Hematochezia, Other


Genitourinary: Frequency, Retention.  negative: Dysuria, Incontinence, Hematuria

, Other


Musculoskeletal: negative: Neck Pain, Shoulder Pain, Arm Pain, Back Pain, Hand 

Pain, Leg Pain, Foot Pain, Other


Neurological: negative: Weakness, Numbness, Incoordination, Change in Speech, 

Confusion, Seizures, Other





- Medications/Allergies


Allergies/Adverse Reactions: 


 Allergies











Allergy/AdvReac Type Severity Reaction Status Date / Time


 


No Known Drug Allergies Allergy   Verified 10/20/17 08:15











Medications: 


 Current Medications





Acetaminophen (Tylenol)  650 mg PO Q4H PRN


   PRN Reason: Headache/Fever or Pain


   Last Admin: 10/23/17 05:52 Dose:  650 mg


Hydrocodone Bitart/Acetaminophen (Norco 5/325)  1 tab PO Q4H PRN


   PRN Reason: Moderate Pain (4-6)


Hydrocodone Bitart/Acetaminophen (Norco 10/325)  1 tab PO Q4H PRN


   PRN Reason: Mild Pain (1-3)


Hydrocodone Bitart/Acetaminophen (Norco 10/325)  2 tab PO Q4H PRN


   PRN Reason: Moderate Pain (4-6)


   Last Admin: 10/21/17 08:44 Dose:  2 tab


Al Hydroxide/Mg Hydroxide (Maalox)  15 ml PO Q4H PRN


   PRN Reason: Heartburn  or Indigestion


Artificial Tears (Tears Naturale)  0 drop EA EYE PRN PRN


   PRN Reason: Dry Eyes


Enoxaparin Sodium (Lovenox)  40 mg SC 0900 [facility]


   Last Admin: 10/23/17 09:01 Dose:  40 mg


Famotidine (Pepcid)  20 mg PO BID [facility]


   Last Admin: 10/23/17 09:00 Dose:  20 mg


Fentanyl (Sublimaze)  50 mcg SLOW IVP Q1H PRN


   PRN Reason: Moderate to Severe Pain (6-10)


Fentanyl (Sublimaze)  100 mcg SLOW IVP Q1H PRN


   PRN Reason: Moderate to Severe Pain (6-10)


Guaifenesin (Robitussin Sf)  200 mg PO Q4H PRN


   PRN Reason: Cough


Hydralazine HCl (Apresoline)  10 mg SLOW IVP Q4H PRN


   PRN Reason: Systolic BP > 180


Sodium Chloride (Normal Saline 0.9%)  1,000 mls @ 100 mls/hr IV .Q10H [facility]


   Last Admin: 10/23/17 08:57 Dose:  Not Given


Vancomycin HCl 1.75 gm/ Sodium (Chloride)  500 mls @ 250 mls/hr IVPB 1200,2359 

[facility]


   Last Admin: 10/22/17 23:09 Dose:  500 mls


Ketorolac Tromethamine (Toradol)  15 mg IVP Q6H PRN


   PRN Reason: Pain


   Stop: 10/27/17 11:56


Loperamide HCl (Imodium)  2 mg PO PRN PRN


   PRN Reason: Diarrhea/Loose Stools


Loratadine (Claritin)  10 mg PO DAILYPRN PRN


   PRN Reason: Sinus Symptoms


Magnesium Hydroxide (Milk Of Magnesium)  30 ml PO DAILYPRN PRN


   PRN Reason: Constipation


Mineral Oil/White Petrolatum (Eucerin Cream)  0 gm TOP BIDPRN PRN


   PRN Reason: Dry Skin


Morphine Sulfate (Morphine Sulfate)  2 mg SLOW IVP Q4H PRN


   PRN Reason: Moderate Pain (4-6)


   Last Admin: 10/22/17 02:14 Dose:  2 mg


Ondansetron HCl (Zofran)  4 mg IVP Q6H PRN


   PRN Reason: Nausea/Vomiting


Ondansetron HCl (Zofran Odt)  4 mg PO Q6H PRN


   PRN Reason: Nausea/Vomiting


Phenol (Chloraseptic Spray 180 Ml Bot)  0 ml PO PRN PRN


   PRN Reason: Sore Throat


Senna (Senokot)  2 tab PO HSPRN PRN


   PRN Reason: Constipation


Sodium Chloride (Flush - Normal Saline)  10 ml IVF Q12HR [facility]


   Last Admin: 10/23/17 09:01 Dose:  Not Given


Sodium Chloride (Flush - Normal Saline)  10 ml IVF PRN PRN


   PRN Reason: Saline Flush


Sodium Chloride (Ocean Nasal Spray 0.65%)  0 ml EA NARE QIDPRN PRN


   PRN Reason: Nasal Congestion


Temazepam (Restoril)  15 mg PO HSPRN PRN


   PRN Reason: Insomnia

## 2017-10-24 VITALS — SYSTOLIC BLOOD PRESSURE: 152 MMHG | DIASTOLIC BLOOD PRESSURE: 96 MMHG

## 2017-10-24 LAB
HYALINE CASTS #/AREA URNS LPF: (no result) LPF
PROT UR STRIP.AUTO-MCNC: 30 MG/DL
RBC UR QL AUTO: (no result) HPF (ref 0–3)

## 2017-10-24 RX ADMIN — HYDROCODONE BITARTRATE AND ACETAMINOPHEN PRN TAB: 10; 325 TABLET ORAL at 04:04

## 2017-10-24 RX ADMIN — Medication SCH: at 09:04

## 2017-10-24 NOTE — DIS
DATE OF ADMISSION:  10/19/2017

 

DATE OF DISCHARGE:  10/24/2017

 

PRIMARY CARE PHYSICIAN:  None.  The patient will establish care at [facility] in [facility].

 

DISCHARGE FOLLOWUP:   With,

1.  Infectious Disease, Dr. Ortez.

2.  [facility].

 

CONDITION AT THE TIME OF DISCHARGE:  Stable and improved.

 

DISCHARGE DIAGNOSES:

1.  Cellulitis of the right upper extremity.

2.  Right elbow olecranon bursitis, status post incision and drainage.

3.  Moderate obesity.

 

CONSULTATIONS IN THE HOSPITAL:  Include,

1.  Orthopedics, Dr. Leland Gray.

2.  Infectious Disease, Dr. Ortez.

 

PROCEDURES DURING THE HOSPITAL:  Include,

1.  X-ray of the right forearm and elbow.  It did not show any fracture dislocation, bony destructio
n, periosteal reaction or foreign body in the arm.  Elbow x-ray was unremarkable as well without any
 fracture dislocation or bony destruction.

2.  Incision and drainage of the right arm and right olecranon bursa by Dr. Gary on 10/20/2017.

3.  Ultrasound of the right upper extremity, which shows no evidence of DVT.

4.  Wound VAC placement.

 

ADMISSION HISTORY:  Mr. Alvarado is a very pleasant 62-year-old otherwise healthy  male who p
resented to the emergency room for complaints of right upper extremity swelling.  He has a history o
f elbow injury 1 week prior to presentation.  It started with a small cut, but then he noted that hi
s right arm was getting more and more swollen and was associated with warmth, erythema, blister form
ation and some drainage as well.  He was hemodynamically stable at the time of presentation.  His 
ite blood cell count was 16.8 and he was started on empiric antibiotics for right upper extremity ce
llulitis.  Orthopedics was consulted to evaluate for the elbow joint to rule out septic joint infect
ion.  Please see admission history and physical for further details.

 

HOSPITAL COURSE:  The patient was continued on IV antibiotics and Orthopedics saw the patient.  They
 agreed that he most likely has septic olecranon bursitis along with a cellulitis secondary to that.
  He was taken to the OR and underwent incision and drainage of the right upper arm cellulitis as we
ll as right olecranon bursitis incision and drainage.  He tolerated the procedure very well.  A woun
d VAC was placed and Infectious Disease was consulted with regards to the choice of antibiotics.  Hi
s cultures from the wound grew MRSA.  He was treated with vancomycin in the hospital, and after disc
harge, he is given prescriptions to start taking clindamycin 300 mg q.i.d. for 2 weeks as per the ID
 directions.  He will take probiotics with that and will follow up with Dr. Ortez as an outpatient.

 

He was seen and examined prior to discharge.  This morning, he is hemodynamically stable and cleared
 by Orthopedics for discharge.  No new issues voiced.

 

PHYSICAL EXAMINATION:

VITAL SIGNS:  Temperature 98.0, pulse of 77, respirations 16, saturating 96% on room air, blood pres
sure 152/96.

GENERAL:  No acute distress, awake, alert, oriented x3.

CHEST:  Clear to auscultation without any wheezing, rales, or rhonchi.  Rate and rhythm is regular w
ithout any murmur, rubs or gallops.

ABDOMEN:  Soft, nontender, nondistended.

EXTREMITIES:  Right upper extremity is in bandage.

 

LABORATORY EXAMINATION:  Blood cultures remained negative until date.  Elbow wound culture showed MR POTTS, which was sensitive to vancomycin and clindamycin.  CBC shows WBCs at 9.9, which was 16.8 upon p
resentation.  Serum chemistries unremarkable.  C-reactive protein improved from 15.23 to 2.89.

 

Wound VAC was arranged with the help of the case management.  The patient will follow up with outpat
ient wound care clinic at [facility].

 

At this time, discharge plan was discussed with the patient who verbalized understanding.  He will f
ollow up and establish care with a primary care physician and will get health screening done as well
.  I discussed the discharge plan in detail with the patient, who verbalized understanding.  Prescri
ptions were provided prior to discharge.

 

Total time spent in the discharge 32 minutes.

## 2017-10-27 ENCOUNTER — HOSPITAL ENCOUNTER (OUTPATIENT)
Dept: HOSPITAL 92 - WCC | Age: 62
Discharge: HOME | End: 2017-10-27
Attending: FAMILY MEDICINE
Payer: COMMERCIAL

## 2017-10-27 DIAGNOSIS — T81.89XD: Primary | ICD-10-CM

## 2017-10-27 PROCEDURE — A4218 STERILE SALINE OR WATER: HCPCS

## 2017-10-27 PROCEDURE — 97606 NEG PRS WND THER DME>50 SQCM: CPT

## 2017-11-02 ENCOUNTER — HOSPITAL ENCOUNTER (OUTPATIENT)
Dept: HOSPITAL 92 - WCC | Age: 62
Discharge: HOME | End: 2017-11-02
Attending: FAMILY MEDICINE
Payer: COMMERCIAL

## 2017-11-02 DIAGNOSIS — T81.89XD: Primary | ICD-10-CM

## 2017-11-02 PROCEDURE — 99203 OFFICE O/P NEW LOW 30 MIN: CPT

## 2017-11-02 PROCEDURE — 11042 DBRDMT SUBQ TIS 1ST 20SQCM/<: CPT

## 2017-11-02 PROCEDURE — G0463 HOSPITAL OUTPT CLINIC VISIT: HCPCS

## 2017-11-02 NOTE — HP
DATE OF SERVICE: 11/02/2017

 

HISTORY OF PRESENT ILLNESS:  Mr. Kaiden Alvarado is a very pleasant 62-year-old 
gentleman who presents to the Wound Center for evaluation of a wound of the 
right elbow subsequent to incision and drainage of the right arm and right 
olecranon bursa on 10/20/2017 by Dr. Leland Gray.  Negative pressure therapy 
was initiated subsequent to surgery, and upon discharge from [facility], the patient was referred to the Wound Center for 
assistance with dressing changes of the wound VAC.  The patient was discharged 
to home on clindamycin.  Cultures of the elbow wound obtained on 10/20/2017 
revealed the growth of MRSA.

 

PAST MEDICAL HISTORY: Two ventral hernias.

 

PAST SURGICAL HISTORY:  Incision and drainage of right arm and right olecranon 
bursa on 10/20/2017.

 

MEDICATIONS:

1.  Flomax.

2.  Multivitamin.

3.  Clindamycin.

4.  Probiotics.

 

ALLERGIES: MORPHINE.

 

SOCIAL HISTORY: Significant for tobacco use for 6 months during the patient's 
teenage years.  Social history is also significant for the occasional 
consumption of alcohol since the patient's teenage years.

 

FAMILY HISTORY:  Negative for diabetes mellitus or coronary artery disease.

 

PHYSICAL EXAMINATION:

VITAL SIGNS:  Temperature 97.7, pulse 98, respirations 18, and blood pressure 
145/86.

GENERAL:  A 62-year-old gentleman lying on table in examination room in no 
acute distress.

HEENT:  Normocephalic, atraumatic.

NECK:  No nuchal rigidity.

CHEST:  Clear to auscultation.

CARDIAC:  Regular rate and rhythm.

ABDOMEN:  Soft.

EXTREMITIES:  A wound of the right elbow is present which measures 
approximately 1.2 x 1.9 cm.  The wound is granulating.  Nonviable tissue 
present within the wound margins was debrided with an excisional full-thickness 
debridement.  No purulent drainage is associated with the wound.  No erythema 
of the skin surrounding the wound is present.  No maceration of the skin of the 
periwound is noted.  No significant edema of the right upper extremity is 
present on exam today.

NEUROLOGIC:  Grossly nonfocal.

 

ASSESSMENT AND PLAN:  Right elbow wound as described above.  Negative pressure 
therapy will be continued with dressing changes of the wound VAC 2 times per 
week here in the Wound Center.  The patient has been reminded to continue 
clindamycin as prescribed at the time of discharge.  I will see Mr. Alvarado 
again in 2 weeks.

 

MTDD

## 2017-11-06 ENCOUNTER — HOSPITAL ENCOUNTER (OUTPATIENT)
Dept: HOSPITAL 92 - WCC | Age: 62
Discharge: HOME | End: 2017-11-06
Attending: FAMILY MEDICINE
Payer: COMMERCIAL

## 2017-11-06 DIAGNOSIS — T81.89XD: Primary | ICD-10-CM

## 2017-11-06 PROCEDURE — A4218 STERILE SALINE OR WATER: HCPCS

## 2017-11-06 PROCEDURE — 97602 WOUND(S) CARE NON-SELECTIVE: CPT

## 2017-11-16 ENCOUNTER — HOSPITAL ENCOUNTER (OUTPATIENT)
Dept: HOSPITAL 92 - WCC | Age: 62
Discharge: HOME | End: 2017-11-16
Attending: FAMILY MEDICINE
Payer: COMMERCIAL

## 2017-11-16 DIAGNOSIS — S51.001D: Primary | ICD-10-CM

## 2017-11-16 PROCEDURE — A4218 STERILE SALINE OR WATER: HCPCS

## 2017-11-16 PROCEDURE — 11042 DBRDMT SUBQ TIS 1ST 20SQCM/<: CPT

## 2017-11-16 NOTE — PRG
DATE OF SERVICE:  11/16/2017

 

HISTORY:  Mr. Kaiden Alvarado is a very pleasant 62-year-old gentleman who presents to the Wound Center 
for evaluation of a wound of the right elbow, subsequent to incision and drainage of the right arm an
d right olecranon bursa on 10/20/2017 by Dr. Leland Gray.  Negative pressure therapy was initiated s
ubsequent to surgery, and upon discharge from [facility], the patient was refe
rred to the Wound Center for assistance with dressing changes of the wound VAC.  The patient was disc
harged to home on clindamycin.  Cultures of the elbow wound obtained on 10/20/2017 revealed the growt
h of MRSA.  The patient has completed a course of negative pressure therapy and is now performing jeanette
ssing changes of Aquacel AG on a daily basis after cleansing and irrigation.

 

PHYSICAL EXAMINATION:

VITAL SIGNS:  Temperature 97.7, pulse 97, respirations 18, blood pressure 137/89.

EXTREMITIES:  A wound of the right elbow is present, which measures approximately 1.5 x 0.8 cm.  The 
dimensions of the wound at the time of the patient's visit on 11/09/2017 were approximately 1.5 x 1.0
 cm.  The wound is granulating.  Nonviable tissue present within the wound margins was debrided with 
an excisional full-thickness debridement with the use of a curette.  No purulent drainage is associat
ed with the wound.  No erythema of the skin surrounding the wound is present.  No maceration of the s
kin of the periwound is noted.  No significant edema of the right upper extremity is present on exam 
today.

 

ASSESSMENT AND PLAN:  Right elbow wound as described above, dressing changes of Aquacel AG will be co
ntinued on a daily basis after cleansing and irrigation.  The patient will continue to perform his ow
n dressing changes.  I will see Mr. Alvarado again in two weeks.

## 2017-11-30 ENCOUNTER — HOSPITAL ENCOUNTER (OUTPATIENT)
Dept: HOSPITAL 92 - WCC | Age: 62
Discharge: HOME | End: 2017-11-30
Attending: FAMILY MEDICINE
Payer: COMMERCIAL

## 2017-11-30 DIAGNOSIS — T81.89XD: Primary | ICD-10-CM

## 2017-11-30 PROCEDURE — 11042 DBRDMT SUBQ TIS 1ST 20SQCM/<: CPT

## 2017-11-30 PROCEDURE — A4218 STERILE SALINE OR WATER: HCPCS

## 2017-11-30 NOTE — PRG
DATE OF SERVICE:  11/30/2017

 

HISTORY:  Mr. Kaiden Rae is a very pleasant 62-year-old gentleman accompanied by his wife, who pres
ents to the Wound Center for evaluation of a wound of the right elbow, subsequent to incision and bari
inage of the right arm and right olecranon bursa on 10/20/2017 by Dr. Leland Gray.  Negative pressur
e therapy was initiated subsequent to surgery and upon discharge from [facility], the patient was referred to the Wound Center for assistance with dressing changes of the wound VA
C.  The patient was discharged to home on clindamycin.  Cultures of the elbow wound obtained on 10/20
/2017 revealed no growth of MRSA.  The patient has completed a course of negative pressure therapy an
d is now performing dressing changes of Aquacel AG on a daily basis after cleansing and irrigation.

 

PHYSICAL EXAMINATION:

VITAL SIGNS:  Temperature 97.4, pulse 88, respirations 18, and blood pressure 144/90.

EXTREMITIES:  A wound of the right elbow is present, which measures approximately 0.3 x 0.2 cm.  The 
dimensions of the wound at the time of the patient's visit on 11/16/2017 were approximately 1.5 x 0.8
 cm.  The wound is granulating.  Nonviable tissue present within the wound margins was debrided with 
an excisional full-thickness debridement with the use of a curet.  No purulent drainage is associated
 with the wound.  No erythema of the skin surrounding the wound is present.  No maceration of the ski
n of the periwound is noted.  No significant edema of the right upper extremity is present on exam to
day.

 

ASSESSMENT AND PLAN:  Right elbow wound as described above.  Dressing changes of Aquacel AG will be c
ontinued on a daily basis after cleansing and irrigation.  The patient will continue to perform his o
wn dressing changes.  The wound has almost healed completely and Mr. Alvarado will be discharged from [facility] today with followup on a p.r.n. basis.  The patient has been instructed to continue the precedi
ng dressing changes until the wound has healed completely.  The patient has been given a release in [facility] to return to work without restrictions.

## 2018-02-23 ENCOUNTER — HOSPITAL ENCOUNTER (INPATIENT)
Dept: HOSPITAL 92 - ERS | Age: 63
LOS: 4 days | Discharge: HOME | DRG: 488 | End: 2018-02-27
Attending: HOSPITALIST | Admitting: HOSPITALIST
Payer: COMMERCIAL

## 2018-02-23 VITALS — BODY MASS INDEX: 40.6 KG/M2

## 2018-02-23 DIAGNOSIS — R03.0: ICD-10-CM

## 2018-02-23 DIAGNOSIS — Z87.891: ICD-10-CM

## 2018-02-23 DIAGNOSIS — N18.3: ICD-10-CM

## 2018-02-23 DIAGNOSIS — E87.2: ICD-10-CM

## 2018-02-23 DIAGNOSIS — Z88.5: ICD-10-CM

## 2018-02-23 DIAGNOSIS — B95.62: ICD-10-CM

## 2018-02-23 DIAGNOSIS — N40.0: ICD-10-CM

## 2018-02-23 DIAGNOSIS — L03.116: ICD-10-CM

## 2018-02-23 DIAGNOSIS — M71.162: Primary | ICD-10-CM

## 2018-02-23 DIAGNOSIS — E66.01: ICD-10-CM

## 2018-02-23 DIAGNOSIS — R73.03: ICD-10-CM

## 2018-02-23 LAB
ALBUMIN SERPL BCG-MCNC: 3.9 G/DL (ref 3.4–4.8)
ALP SERPL-CCNC: 131 U/L (ref 40–150)
ALT SERPL W P-5'-P-CCNC: 36 U/L (ref 8–55)
ANION GAP SERPL CALC-SCNC: 8 MMOL/L (ref 10–20)
AST SERPL-CCNC: 40 U/L (ref 5–34)
BASOPHILS # BLD AUTO: 0 THOU/UL (ref 0–0.2)
BASOPHILS NFR BLD AUTO: 0.5 % (ref 0–1)
BILIRUB SERPL-MCNC: 0.8 MG/DL (ref 0.2–1.2)
BUN SERPL-MCNC: 16 MG/DL (ref 8.4–25.7)
CALCIUM SERPL-MCNC: 9.3 MG/DL (ref 7.8–10.44)
CHLORIDE SERPL-SCNC: 103 MMOL/L (ref 98–107)
CO2 SERPL-SCNC: 31 MMOL/L (ref 23–31)
CREAT CL PREDICTED SERPL C-G-VRATE: 0 ML/MIN (ref 70–130)
CRP SERPL-MCNC: 3.51 MG/DL
EOSINOPHIL # BLD AUTO: 0.2 THOU/UL (ref 0–0.7)
EOSINOPHIL NFR BLD AUTO: 2.5 % (ref 0–10)
GLOBULIN SER CALC-MCNC: 5 G/DL (ref 2.4–3.5)
GLUCOSE SERPL-MCNC: 117 MG/DL (ref 80–115)
HGB BLD-MCNC: 15.9 G/DL (ref 14–18)
LIPASE SERPL-CCNC: 18 U/L (ref 8–78)
LYMPHOCYTES # BLD: 2.1 THOU/UL (ref 1.2–3.4)
LYMPHOCYTES NFR BLD AUTO: 23.1 % (ref 21–51)
MAGNESIUM SERPL-MCNC: 2.4 MG/DL (ref 1.6–2.6)
MCH RBC QN AUTO: 30.4 PG (ref 27–31)
MCV RBC AUTO: 90.2 FL (ref 80–94)
MONOCYTES # BLD AUTO: 1.1 THOU/UL (ref 0.11–0.59)
MONOCYTES NFR BLD AUTO: 11.6 % (ref 0–10)
NEUTROPHILS # BLD AUTO: 5.7 THOU/UL (ref 1.4–6.5)
NEUTROPHILS NFR BLD AUTO: 62.3 % (ref 42–75)
PLATELET # BLD AUTO: 333 THOU/UL (ref 130–400)
POTASSIUM SERPL-SCNC: 4.1 MMOL/L (ref 3.5–5.1)
RBC # BLD AUTO: 5.24 MILL/UL (ref 4.7–6.1)
SODIUM SERPL-SCNC: 138 MMOL/L (ref 136–145)
SP GR UR STRIP: 1.05 (ref 1–1.04)
WBC # BLD AUTO: 9.2 THOU/UL (ref 4.8–10.8)

## 2018-02-23 PROCEDURE — 85025 COMPLETE CBC W/AUTO DIFF WBC: CPT

## 2018-02-23 PROCEDURE — 87205 SMEAR GRAM STAIN: CPT

## 2018-02-23 PROCEDURE — 96361 HYDRATE IV INFUSION ADD-ON: CPT

## 2018-02-23 PROCEDURE — 96376 TX/PRO/DX INJ SAME DRUG ADON: CPT

## 2018-02-23 PROCEDURE — 96367 TX/PROPH/DG ADDL SEQ IV INF: CPT

## 2018-02-23 PROCEDURE — 81003 URINALYSIS AUTO W/O SCOPE: CPT

## 2018-02-23 PROCEDURE — 96375 TX/PRO/DX INJ NEW DRUG ADDON: CPT

## 2018-02-23 PROCEDURE — 87040 BLOOD CULTURE FOR BACTERIA: CPT

## 2018-02-23 PROCEDURE — 80053 COMPREHEN METABOLIC PANEL: CPT

## 2018-02-23 PROCEDURE — 87086 URINE CULTURE/COLONY COUNT: CPT

## 2018-02-23 PROCEDURE — 83690 ASSAY OF LIPASE: CPT

## 2018-02-23 PROCEDURE — 0M9P3ZX DRAINAGE OF LEFT KNEE BURSA AND LIGAMENT, PERCUTANEOUS APPROACH, DIAGNOSTIC: ICD-10-PCS | Performed by: EMERGENCY MEDICINE

## 2018-02-23 PROCEDURE — A4216 STERILE WATER/SALINE, 10 ML: HCPCS

## 2018-02-23 PROCEDURE — 87070 CULTURE OTHR SPECIMN AEROBIC: CPT

## 2018-02-23 PROCEDURE — 83036 HEMOGLOBIN GLYCOSYLATED A1C: CPT

## 2018-02-23 PROCEDURE — 36415 COLL VENOUS BLD VENIPUNCTURE: CPT

## 2018-02-23 PROCEDURE — 86140 C-REACTIVE PROTEIN: CPT

## 2018-02-23 PROCEDURE — 96365 THER/PROPH/DIAG IV INF INIT: CPT

## 2018-02-23 PROCEDURE — 80202 ASSAY OF VANCOMYCIN: CPT

## 2018-02-23 PROCEDURE — 85652 RBC SED RATE AUTOMATED: CPT

## 2018-02-23 PROCEDURE — 80048 BASIC METABOLIC PNL TOTAL CA: CPT

## 2018-02-23 PROCEDURE — 87077 CULTURE AEROBIC IDENTIFY: CPT

## 2018-02-23 PROCEDURE — 87186 SC STD MICRODIL/AGAR DIL: CPT

## 2018-02-23 PROCEDURE — 83605 ASSAY OF LACTIC ACID: CPT

## 2018-02-23 PROCEDURE — 83735 ASSAY OF MAGNESIUM: CPT

## 2018-02-23 NOTE — RAD
LEFT KNEE:

2/23/18

 

Four views.

 

HISTORY: 

Knee pain.

 

There are mild degenerative changes noted. Mild spurring from the posterior patella as well as mild s
purring from the tibial and femoral condyles as well as tibial spines. Joint spaces are maintained. N
o joint effusion. No fracture or acute abnormality.

 

IMPRESSION:  

Mild degenerative change. 

 

POS: ATTILA

## 2018-02-24 RX ADMIN — Medication SCH: at 08:42

## 2018-02-24 RX ADMIN — Medication SCH: at 22:19

## 2018-02-24 NOTE — PDOC.PN
- Subjective


Encounter Start Date: 02/24/18


Encounter Start Time: 17:15


Subjective: f/u for LLE/Knee cellulitis and prepatellar bursitis with staph 

spp. Tx wit


-: Rocephin and Vancomycin. Plan for I&D in am.





- Objective


Resuscitation Status: 


 











Resuscitation Status           FULL:Full Resuscitation














MAR Reviewed: Yes


Vital Signs & Weight: 


 Vital Signs (12 hours)











  Temp Pulse Resp BP Pulse Ox


 


 02/24/18 15:50  97.7 F  83  20  139/89  97


 


 02/24/18 11:35  97.9 F  85  20  152/85 H  97


 


 02/24/18 10:00    20  


 


 02/24/18 08:00  97.8 F  89  16   94 L


 


 02/24/18 07:35  97.8 F  89  16  148/85 H  94 L








 Weight











Weight                         307 lb 15.772 oz














I&O: 


 











 02/23/18 02/24/18 02/25/18





 06:59 06:59 06:59


 


Intake Total   1240


 


Balance   1240











Result Diagrams: 


 02/23/18 17:40





 02/23/18 17:40


Additional Labs: 





Microbiology





02/23/18 20:41   Knee - Left   Bacterial Culture - Preliminary


                              Staphylococcus aureus


02/23/18 19:48   Urine voided   Urine Culture - Preliminary


                              NO GROWTH AT 12 HOURS


02/23/18 17:44   Venous blood - Left Arm   Blood Culture - Preliminary


                              Specimen has been received and culture in 

progress.


                              No Growth to date.


02/23/18 17:40   Venous blood - Right Hand   Blood Culture - Preliminary


                              Specimen has been received and culture in 

progress.


                              No Growth to date.





 Laboratory Tests











  02/23/18 02/23/18 02/23/18





  17:31 17:40 17:40


 


ESR Westergren   


 


Hemoglobin A1c  6.2 H  


 


Lactic Acid    2.8 H


 


C-Reactive Protein   3.51 H 














  02/23/18 02/23/18





  17:40 22:20


 


ESR Westergren  39 


 


Hemoglobin A1c  


 


Lactic Acid   1.0


 


C-Reactive Protein  














Phys Exam





- Physical Examination


Constitutional: NAD


HEENT: PERRLA, oral pharynx no lesions


Neck: no JVD, supple


Respiratory: no wheezing, clear to auscultation bilateral


Cardiovascular: RRR


Gastrointestinal: soft, non-tender, no distention, positive bowel sounds


L knee with edema and erythema


Musculoskeletal: pulses present, edema present


Neurological: normal sensation, moves all 4 limbs


Psychiatric: A&O x 3


Skin: normal turgor, cap refill <2 seconds





Dx/Plan


(1) Cellulitis of knee, left


Code(s): L03.116 - CELLULITIS OF LEFT LOWER LIMB   Status: Acute   Comment: 

Staph spp on culture, continue Vancomycin and Rocephin   





(2) Prepatellar bursitis


Code(s): M70.40 - PREPATELLAR BURSITIS, UNSPECIFIED KNEE   Status: Acute   


Qualifiers: 


   Laterality: left   Qualified Code(s): M70.42 - Prepatellar bursitis, left 

knee   


Comment: See #1, plan for I&D in am   





(3) SIRS (systemic inflammatory response syndrome)


Code(s): R65.10 - SIRS OF NON-INFECTIOUS ORIGIN W/O ACUTE ORGAN DYSFUNCTION   

Status: Acute   Comment: Improved, continue Rocephin and Vancomycin   





(4) Lactic acidosis


Code(s): E87.2 - ACIDOSIS   Status: Acute   Comment: Resolving, supportive mgmt

   





- Plan


continue antibiotics, out of bed/ambulate


Stable overall


-: Continue Rocephin and Vancomycin


-: Continue IVF's


-: Plan for I&D 2/25/18


-: AM lab: BMP, CBC





* .

## 2018-02-24 NOTE — HP
DATE OF ADMISSION:  02/23/2018

 

Patient was seen and examined on 02/23/2018.

 

PRIMARY CARE PHYSICIAN:  None.  Patient goes to [facility] as needed.

 

CHIEF COMPLAINT:  Redness of the left lower extremity of 1 week duration.

 

HISTORY OF PRESENT ILLNESS:  Patient is a 62-year-old male who presented to the emergency room with s
welling and redness around the left knee.

 

Approximately 1 week ago, the patient noticed a small skin pustule over the left knee that progressiv
ely got bigger.  He later started having some drainage from the same wound.  The redness progressivel
y got worse over the last 3-4 days.  He also had discomfort, moderate in intensity, worse on movement
.  Over the last 24 hours, he noticed some redness around his left leg as well for which he presented
 to the emergency room.  He denies significant fever or chills.  No injury reported.  He was admitted
 at this facility last year for right elbow olecranon bursitis, status post I&D requiring wound VAC.

 

PAST MEDICAL HISTORY:

1.  Obesity.

2.  Hernia.

3.  Right-sided olecranon bursitis last year requiring I&D.

 

PAST SURGICAL HISTORY:  As discussed above.

 

ALLERGIES:  The patient is allergic to MORPHINE per previous record.

 

CURRENT HOME MEDICATIONS:  Patient is supposed to be on Flomax, which he has discontinued taking.

 

SOCIAL HISTORY:  He is a former smoker.  Occasional alcohol use.

 

FAMILY HISTORY:  Negative for diabetes or heart disease.

 

REVIEW OF SYSTEMS:  The following complete review of systems was negative, unless otherwise mentioned
 in the HPI or below:  Constitutional:  Weight loss or gain, ability to conduct usual activities.  Sk
in:  Rash, itching.  Eyes:  Double vision, pain.  ENT/Mouth:  Nose bleeding, neck stiffness, pain, te
nderness.  Cardiovascular:  Palpitations, dyspnea on exertion, orthopnea.  Respiratory:  Shortness of
 breath, wheezing, cough, hemoptysis, fever or night sweats.  Gastrointestinal:  Poor appetite, abdom
inal pain, heartburn, nausea, vomiting, constipation, or diarrhea.  Genitourinary:  Urgency, frequenc
y, dysuria, nocturia.  Musculoskeletal:  Pain, swelling.  Neurologic/Psychiatric:  Anxiety, depressio
n.  Allergy/Immunologic:  Skin rash, bleeding tendency.

 

PHYSICAL EXAMINATION:

VITAL SIGNS:  Temperature 99.1, respirations 16, pulse of 93, blood pressure 163/91 with O2 saturatio
n 97% on room air.

GENERAL:  A 62-year-old male in no apparent distress.  Pain controlled.

HEENT:  Head atraumatic, normocephalic.  Sclerae anicteric.  Moist mucous membrane, no oral lesion.

NECK:  Supple, no JVD, no carotid bruit.

LUNGS:  Essentially clear to auscultation bilaterally.  No wheezing, rales or rhonchi.

HEART:  S1, S2 present.  Regular rate and rhythm.  No murmur, rubs or gallops.

ABDOMEN:  Soft.  Bowel sounds present.  There is chronic abdominal wall hernia without any tenderness
.  Bowel sounds are present.

EXTREMITIES:  Right lower extremity without any swelling or redness.  There is erythema over the ante
rior left knee as well as left medial leg.  There is dressing present over the knee lesion.  There is
 also warmth and tenderness around this area.

NEUROLOGIC:  Grossly nonfocal, moves all four extremities.

PSYCHIATRIC:  Alert, awake, oriented x3.

SKIN:  Warm and dry except for mentioned above.

LYMPH NODES:  No palpable lymph nodes in the neck and groin.

 

LABORATORY AND X-RAY FINDINGS:

1.  CBC showed WBC 9.2 with hemoglobin 15.9, hematocrit 47.2, platelets 333.

2.  ESR 39.

3.  Hemoglobin A1c 6.2.  Lactic acid on admission 2.8.  Repeat 1.0.  CRP 3.5, BUN 16, creatinine 1.25
.

4.  X-ray of the left knee by my review showed mild degenerative changes.  CT scan of the left lower 
extremity with contrast showed prepatellar bursitis with small joint effusion and inflammatory change
s in the subcutaneous tissue over the anterior left knee.

 

IMPRESSION:

1.  Left lower extremity cellulitis/prepatellar bursitis.

2.  Systemic inflammatory response syndrome secondary to #1.

3.  Prediabetes with hemoglobin A1c 6.2.

4.  Elevated inflammatory markers.

5.  Chronic kidney disease stage 3.

6.  History of right elbow olecranon bursitis requiring I&D last year.

7.  Suspected benign prostatic hypertrophy.

8.  Former smoker.

 

PLAN:  The patient will be monitored on the surgical unit.  Orthopedic will be consulted.  The patien
t will be kept n.p.o. past midnight.  Gentle IV hydration due to contrast exposure.  Education for pr
ediabetes and obesity.  We will add Flomax per patient request.

 

Plan of care was discussed with the patient in detail.  He stated understanding.

## 2018-02-25 LAB
ANION GAP SERPL CALC-SCNC: 8 MMOL/L (ref 10–20)
BASOPHILS # BLD AUTO: 0.1 THOU/UL (ref 0–0.2)
BASOPHILS NFR BLD AUTO: 1 % (ref 0–1)
BUN SERPL-MCNC: 11 MG/DL (ref 8.4–25.7)
CALCIUM SERPL-MCNC: 9.3 MG/DL (ref 7.8–10.44)
CHLORIDE SERPL-SCNC: 105 MMOL/L (ref 98–107)
CO2 SERPL-SCNC: 29 MMOL/L (ref 23–31)
CREAT CL PREDICTED SERPL C-G-VRATE: 189 ML/MIN (ref 70–130)
EOSINOPHIL # BLD AUTO: 0.3 THOU/UL (ref 0–0.7)
EOSINOPHIL NFR BLD AUTO: 4.3 % (ref 0–10)
GLUCOSE SERPL-MCNC: 117 MG/DL (ref 80–115)
HGB BLD-MCNC: 14.7 G/DL (ref 14–18)
LYMPHOCYTES # BLD: 2 THOU/UL (ref 1.2–3.4)
LYMPHOCYTES NFR BLD AUTO: 26.8 % (ref 21–51)
MCH RBC QN AUTO: 30.1 PG (ref 27–31)
MCV RBC AUTO: 89.8 FL (ref 80–94)
MONOCYTES # BLD AUTO: 0.8 THOU/UL (ref 0.11–0.59)
MONOCYTES NFR BLD AUTO: 10 % (ref 0–10)
NEUTROPHILS # BLD AUTO: 4.4 THOU/UL (ref 1.4–6.5)
NEUTROPHILS NFR BLD AUTO: 58 % (ref 42–75)
PLATELET # BLD AUTO: 306 THOU/UL (ref 130–400)
POTASSIUM SERPL-SCNC: 3.6 MMOL/L (ref 3.5–5.1)
RBC # BLD AUTO: 4.9 MILL/UL (ref 4.7–6.1)
SODIUM SERPL-SCNC: 138 MMOL/L (ref 136–145)
VANCOMYCIN TROUGH SERPL-MCNC: 16.7 UG/ML
WBC # BLD AUTO: 7.6 THOU/UL (ref 4.8–10.8)

## 2018-02-25 PROCEDURE — 0S9D00Z DRAINAGE OF LEFT KNEE JOINT WITH DRAINAGE DEVICE, OPEN APPROACH: ICD-10-PCS | Performed by: ORTHOPAEDIC SURGERY

## 2018-02-25 PROCEDURE — 0MBP0ZZ EXCISION OF LEFT KNEE BURSA AND LIGAMENT, OPEN APPROACH: ICD-10-PCS | Performed by: ORTHOPAEDIC SURGERY

## 2018-02-25 RX ADMIN — Medication SCH: at 21:41

## 2018-02-25 RX ADMIN — Medication SCH: at 11:15

## 2018-02-25 NOTE — CON
DATE OF CONSULTATION:  02/24/2018

 

REQUESTING PHYSICIAN:  Dr. Shaffer.

 

CHIEF COMPLAINT:  Left anterior knee pain, swelling, and redness x1 week.

 

BRIEF HISTORY OF PRESENT ILLNESS:  The patient is a 62-year-old gentleman who initially presented to 
the emergency room with a 1 week history of swelling and redness around the left knee.  He reports ap
proximately one week prior to this admission, he noted a small pustule at the anterior knee that prog
ressively got bigger.  It eventually ruptured and has now been draining some seropurulent material fo
r approximately last 3 or 4 days.  With the increasing drainage and increasing pain, he subsequently 
presented to the emergency room where he was found to have a draining prepatellar bursa that appeared
 infected.  Of note, the patient is now about 6 months status post right elbow olecranon bursa irriga
tion and debridement for a similar episode.

 

PAST MEDICAL HISTORY:  Remarkable for obesity as well as prior infection.

 

PAST SURGICAL HISTORY:  Remarkable for olecranon I&D in 2007.

 

MEDICATIONS:  None.

 

ALLERGIES:  MORPHINE which makes him agitated.

 

SOCIAL HISTORY:  He is a former smoker.  Does not smoke currently.  Reports occasional alcohol use.

 

FAMILY HISTORY:  Noncontributory for this current admission.

 

REVIEW OF SYSTEMS:  He does report some mild fevers and chills recently.  Otherwise, denies chest leonidas
n or shortness of breath.  Denies numbness or tingling in the lower extremity.

 

PHYSICAL EXAMINATION:

VITAL SIGNS:  Temperature 99.1, heart rate 93, respiratory rate 16, and blood pressure 163/91.

HEENT:  Atraumatic, normocephalic.

HEART:  Shows a regular rate and rhythm without murmur.

LUNGS:  Clear to auscultation bilaterally with no wheezes or rhonchi.  Chest wall is nontender.

ABDOMEN:  Round and soft.  There is a chronic anterior wall hernia with no tenderness.

EXTREMITIES:  Remarkable for bilateral upper extremities that are atraumatic.  He has a well healed p
osterior incision at the right elbow.  The right lower extremity also is atraumatic.  Left lower extr
emity remarkable for anterior knee with draining sinus communicating with the prepatellar bursa with 
seropurulent material coming from this wound.  The erythema does extend around the anterior knee, alt
tito there does not appear to be a knee effusion.  He is able to flex and extend his knee with 90 de
gree arc of motion with minimal increased pain.

 

LABORATORY DATA:  He was found to have white count of 9.2, hematocrit of 47.2 and 330,000 platelets. 
 Sedimentation rate of 39 and C-reactive protein of 3.5.

 

IMAGING DATA:  X-rays and plain films of the left knee show mild degenerative changes but no obvious 
joint effusion.  There is soft tissue swelling noted in the prepatellar bursa region.

 

ASSESSMENT:  Left septic prepatellar bursa with a history of diabetes.

 

PLAN:  At this time, the patient will be started on IV antibiotics.  A culture was able to be obtaine
d in the emergency room.  We will plan on taking patient to the operating room for incision and drain
age of this prepatellar bursa.  I have discussed with patient the risks and benefits of the procedure
.  The risks include, but are not limited to bleeding, infection, worsening, nerve injury, DVT, PE, l
oss of limb or life.  The patient appears to understand and does wish to proceed.  Consent will be ob
tained prior to surgery.

## 2018-02-25 NOTE — PDOC.PN
- Subjective


Encounter Start Date: 02/25/18


Encounter Start Time: 15:35


Subjective: f/u for L knee septic prepatellar bursitis s/p I&D 2/25/18. Feels 

ok over


-: all but some HA. BP elevated post-op. 





- Objective


Resuscitation Status: 


 











Resuscitation Status           FULL:Full Resuscitation














MAR Reviewed: Yes


Vital Signs & Weight: 


 Vital Signs (12 hours)











  Temp Pulse Resp BP Pulse Ox


 


 02/25/18 10:40  98.1 F  98  18  158/93 H  93 L


 


 02/25/18 08:00  97.7 F  85  20   94 L


 


 02/25/18 05:30      93 L


 


 02/25/18 03:59  97.7 F  85  20  171/108 H  93 L








 Weight











Weight                         307 lb 15.772 oz














I&O: 


 











 02/24/18 02/25/18 02/26/18





 06:59 06:59 06:59


 


Intake Total  1240 720


 


Balance  1240 720











Result Diagrams: 


 02/25/18 03:57





 02/25/18 03:57


Additional Labs: 





Microbiology





02/23/18 20:41   Knee - Left   Bacterial Culture - Final


                              Methicillin resistant S.aureus


02/23/18 19:48   Urine voided   Urine Culture - Final


                              NO GROWTH AT 36 HOURS


02/23/18 20:41   Knee - Left   Bacterial Culture - Preliminary


                              Staphylococcus aureus


02/23/18 19:48   Urine voided   Urine Culture - Preliminary


                              NO GROWTH AT 12 HOURS


02/23/18 17:44   Venous blood - Left Arm   Blood Culture - Preliminary


                              Specimen has been received and culture in 

progress.


                              No Growth to date.


02/23/18 17:44   Venous blood - Left Arm   Blood Culture - Preliminary


                              NO GROWTH AT 48 HOURS


02/23/18 17:40   Venous blood - Right Hand   Blood Culture - Preliminary


                              Specimen has been received and culture in 

progress.


                              No Growth to date.


02/23/18 17:40   Venous blood - Right Hand   Blood Culture - Preliminary


                              NO GROWTH AT 48 HOURS





 Laboratory Tests











  02/23/18 02/23/18 02/23/18





  17:31 17:40 17:40


 


ESR Westergren   


 


Creatinine   1.25 


 


Hemoglobin A1c  6.2 H  


 


Lactic Acid    2.8 H


 


C-Reactive Protein   3.51 H 














  02/23/18 02/23/18





  17:40 22:20


 


ESR Westergren  39 


 


Creatinine  


 


Hemoglobin A1c  


 


Lactic Acid   1.0


 


C-Reactive Protein  














Phys Exam





- Physical Examination


Constitutional: NAD


HEENT: PERRLA, oral pharynx no lesions


Neck: no JVD, supple


Respiratory: no wheezing, clear to auscultation bilateral


Cardiovascular: RRR


Gastrointestinal: soft, non-tender, no distention, positive bowel sounds


L knee with dressings in place


Musculoskeletal: pulses present, edema present


Neurological: normal sensation, moves all 4 limbs


Psychiatric: A&O x 3


Skin: normal turgor, cap refill <2 seconds





Dx/Plan


(1) Cellulitis of knee, left


Code(s): L03.116 - CELLULITIS OF LEFT LOWER LIMB   Status: Acute   Comment: 

MRSA on cx, continue Vancomycin, d/c Rocephin, likely can transition to 

Clindamycin or Bactrim for outpt po abx   





(2) Prepatellar bursitis


Code(s): M70.40 - PREPATELLAR BURSITIS, UNSPECIFIED KNEE   Status: Acute   


Qualifiers: 


   Laterality: left   Qualified Code(s): M70.42 - Prepatellar bursitis, left 

knee   


Comment: s/p I&D 2/25/18   





(3) SIRS (systemic inflammatory response syndrome)


Code(s): R65.10 - SIRS OF NON-INFECTIOUS ORIGIN W/O ACUTE ORGAN DYSFUNCTION   

Status: Acute   Comment: Improved, continue Vancomycin   





(4) Lactic acidosis


Code(s): E87.2 - ACIDOSIS   Status: Acute   Comment: Resolving, supportive mgmt

   





(5) Elevated blood pressure reading


Code(s): R03.0 - ELEVATED BLOOD-PRESSURE READING, W/O DIAGNOSIS OF HTN   Status

: Acute   Comment: Serial monitoring, consider antihypertensive if persists   





- Plan


continue antibiotics, PT/OT, out of bed/ambulate, DVT proph w/SCDs


Stable overall


-: Continue Vancomycin


-: D/C Rocephin


-: Local WCT


-: Clonidine 0.1mg po q4h prn SBP>170





* Likely home in 48h

## 2018-02-25 NOTE — OP
DATE OF SURGERY:  02/25/2018

 

PREOPERATIVE DIAGNOSIS:  Left knee septic prepatellar bursa.

 

POSTOPERATIVE DIAGNOSIS:  Left knee septic prepatellar bursa.

 

SURGICAL PROCEDURE:  Incision and drainage of left prepatellar bursa.

 

ANESTHESIA:  General.

 

SURGEON:  Josue Lees M.D.

 

TOURNIQUET TIME:  Sixteen minutes at 300 mmHg.

 

SPECIMEN:  Swab sent for Gram stain, culture and sensitivity.

 

DRAINS:  Penrose x1.

 

COMPLICATIONS:  None.

 

OUTCOME:  Satisfactory.

 

INDICATIONS:  The patient is a 62-year-old gentleman with a 1-week history of worsening right knee pa
in and redness and now with drainage coming from his prepatellar bursa.  After discussion with patien
t including risks and benefits, we have decided to proceed with an incision and drainage procedure.  
Informed consent has been obtained.  I believe all questions have been answered.

 

PROCEDURE:  The patient was brought to the operating room and a timeout performed followed by inducti
on of general anesthesia.  Next, a sterile prep and drape was performed of the left lower extremity. 
 A midline anterior knee incision was then made centered over the small draining sinus communicating 
with the prepatellar bursa.  After skin was sharply incised, dissection was carried down bluntly with
 seropurulent material coming from the prepatellar bursa.  A finger was used to breakdown some septae
 of the bursa to open it into one contiguous cavity.  Next some marginally viable fat tissue was debr
ided sharply with the scalpel including skin and subcutaneous level.  Once all necrotic tissue had be
en sharply debrided, the wound was irrigated with 3 liters of normal saline using Pulsavac.  At the c
ompletion of this, no further nonviable tissue was encountered.  The skin was closed with nylon loose
ly superiorly and then a half-inch Penrose drain was placed at the inferior dependent portion of the 
wound.  Next, a Xeroform gauze, Webril, and Ace wrap dressing was applied to the knee.  Tourniquet wa
s let down and then patient was transferred to recovery room in stable condition.  There were no comp
lications.  He tolerated the procedure well.

## 2018-02-26 RX ADMIN — Medication SCH: at 21:20

## 2018-02-26 RX ADMIN — Medication SCH ML: at 08:41

## 2018-02-26 NOTE — PDOC.PN
- Subjective


Encounter Start Date: 02/26/18


Encounter Start Time: 18:00


Subjective: f/u s/p L knee I&D for septic prepatellar bursitis showing MRSA 

currently


-: on Vancomycin. Some knee pain but overall doing ok. 





- Objective


Resuscitation Status: 


 











Resuscitation Status           FULL:Full Resuscitation














Vital Signs & Weight: 


 Vital Signs (12 hours)











  Temp Pulse Resp BP Pulse Ox


 


 02/26/18 16:47  97.8 F  87  16  156/95 H  96


 


 02/26/18 11:43  97.6 F  90  18  162/96 H  97


 


 02/26/18 08:34  98.2 F  90  16   96


 


 02/26/18 07:55  98.2 F  90  16  139/79  96


 


 02/26/18 06:20     149/92 H 








 Weight











Weight                         307 lb 15.772 oz














I&O: 


 











 02/25/18 02/26/18 02/27/18





 06:59 06:59 06:59


 


Intake Total 1240 720 990


 


Balance 1240 720 990











Result Diagrams: 


 02/25/18 03:57





 02/25/18 03:57


Additional Labs: 





Microbiology





02/23/18 20:41   Knee - Left   Bacterial Culture - Final


                              Methicillin resistant S.aureus


02/23/18 19:48   Urine voided   Urine Culture - Final


                              NO GROWTH AT 36 HOURS


02/23/18 20:41   Knee - Left   Bacterial Culture - Preliminary


                              Staphylococcus aureus


02/23/18 19:48   Urine voided   Urine Culture - Preliminary


                              NO GROWTH AT 12 HOURS


02/23/18 17:44   Venous blood - Left Arm   Blood Culture - Preliminary


                              Specimen has been received and culture in 

progress.


                              No Growth to date.


02/23/18 17:44   Venous blood - Left Arm   Blood Culture - Preliminary


                              NO GROWTH AT 48 HOURS


02/23/18 17:40   Venous blood - Right Hand   Blood Culture - Preliminary


                              Specimen has been received and culture in 

progress.


                              No Growth to date.


02/23/18 17:40   Venous blood - Right Hand   Blood Culture - Preliminary


                              NO GROWTH AT 48 HOURS





 Laboratory Tests











  02/23/18 02/23/18 02/23/18





  17:31 17:40 17:40


 


ESR Westergren   


 


Creatinine   1.25 


 


Hemoglobin A1c  6.2 H  


 


Lactic Acid    2.8 H


 


C-Reactive Protein   3.51 H 














  02/23/18 02/23/18





  17:40 22:20


 


ESR Westergren  39 


 


Creatinine  


 


Hemoglobin A1c  


 


Lactic Acid   1.0


 


C-Reactive Protein  














Phys Exam





- Physical Examination


Constitutional: NAD


HEENT: PERRLA, oral pharynx no lesions


Neck: no JVD, supple


Respiratory: no wheezing, clear to auscultation bilateral


Cardiovascular: RRR


Gastrointestinal: soft, non-tender, no distention, positive bowel sounds


L knee edema


Musculoskeletal: pulses present, edema present


Neurological: normal sensation, moves all 4 limbs


Psychiatric: A&O x 3


Skin: normal turgor, cap refill <2 seconds





Dx/Plan


(1) Cellulitis of knee, left


Code(s): L03.116 - CELLULITIS OF LEFT LOWER LIMB   Status: Acute   Comment: 

MRSA on cx, continue Vancomycin, d/c Rocephin, likely can transition to 

Clindamycin or Bactrim for outpt po abx   





(2) Prepatellar bursitis


Code(s): M70.40 - PREPATELLAR BURSITIS, UNSPECIFIED KNEE   Status: Acute   


Qualifiers: 


   Laterality: left   Qualified Code(s): M70.42 - Prepatellar bursitis, left 

knee   


Comment: s/p I&D 2/25/18- POD#1, local wound care   





(3) SIRS (systemic inflammatory response syndrome)


Code(s): R65.10 - SIRS OF NON-INFECTIOUS ORIGIN W/O ACUTE ORGAN DYSFUNCTION   

Status: Acute   Comment: Improved, continue Vancomycin   





(4) Lactic acidosis


Code(s): E87.2 - ACIDOSIS   Status: Acute   Comment: Resolving, supportive mgmt

   





(5) Elevated blood pressure reading


Code(s): R03.0 - ELEVATED BLOOD-PRESSURE READING, W/O DIAGNOSIS OF HTN   Status

: Acute   Comment: Serial monitoring, start Lisinopril 10mg daily, titrate to 

clinical response   





- Plan


continue antibiotics, PT/OT, , out of bed/ambulate, DVT proph w/

SCDs


Stable overall


-: Continue Vancomycin likely transition to Clindamycin or Bactrim


-: Local WCT


-: Start Lisinopril 10mg daily


-: Likely home 2/2/18





* .

## 2018-02-27 VITALS — TEMPERATURE: 98.3 F | DIASTOLIC BLOOD PRESSURE: 88 MMHG | SYSTOLIC BLOOD PRESSURE: 154 MMHG

## 2018-02-27 RX ADMIN — Medication SCH ML: at 08:43

## 2018-02-27 NOTE — PDOC.PN
- Subjective


Encounter Start Date: 02/27/18


Encounter Start Time: 10:15





Pt seen and exmained, chart reviewe din its entrety, thsi is my first visit 

with this patient.





Admitted with prepatellar bursitis, S/P I&S 2/25, cultures sent,.  both 

Specimens with MRSA, sensitive to Tetracycline, Clinda, Vanc, TMP/STX, and 

linezolid.





pt is 307 pounds, normal renal function with GFR > 90 today.





No F/C, no N/v/d/C.  Pain well controlled.





10 point ROS performed and neg for all systems except as per HPI





- Objective


Resuscitation Status: 


 











Resuscitation Status           FULL:Full Resuscitation














MAR Reviewed: Yes


Vital Signs & Weight: 


 Vital Signs (12 hours)











  Temp Pulse Resp BP BP Pulse Ox


 


 02/27/18 08:42     142/88 H  


 


 02/27/18 08:05  97.8 F  81  18   142/88 H  94 L


 


 02/27/18 04:10  98 F  82  16   117/72  95


 


 02/27/18 00:00  98.2 F  81  15   141/80 H  95








 Weight











Weight                         307 lb 15.772 oz














I&O: 


 











 02/26/18 02/27/18 02/28/18





 06:59 06:59 06:59


 


Intake Total 720 2230 


 


Balance 720 2230 











Result Diagrams: 


 02/25/18 03:57





 02/25/18 03:57


Radiology Reviewed by me: Yes


EKG Reviewed by me: Yes





Phys Exam





- Physical Examination


Constitutional: NAD


HEENT: PERRLA, moist MMs, sclera anicteric, oral pharynx no lesions


Neck: no nodes, no JVD, supple, full ROM


Respiratory: no wheezing, no rales, no rhonchi, clear to auscultation bilateral


Cardiovascular: RRR, no significant murmur, no rub


Gastrointestinal: soft, non-tender, no distention, positive bowel sounds


Musculoskeletal: pulses present, edema present


left knee dressing C/D/i, no strikethrough


Neurological: non-focal, normal sensation, moves all 4 limbs


Lymphatic: no nodes


Psychiatric: normal affect


Skin: no rash, normal turgor, cap refill <2 seconds





Dx/Plan


(1) MRSA (methicillin resistant Staphylococcus aureus) infection


Code(s): A49.02 - METHICILLIN RESIS STAPH INFECTION, UNSP SITE   Status: Acute 

  





(2) Prepatellar bursitis, left knee


Code(s): M70.42 - PREPATELLAR BURSITIS, LEFT KNEE   Status: Acute   





(3) Elevated blood pressure reading


Code(s): R03.0 - ELEVATED BLOOD-PRESSURE READING, W/O DIAGNOSIS OF HTN   Status

: Acute   Comment: Serial monitoring, start Lisinopril 10mg daily, titrate to 

clinical response.  fairly well controlle,d will continue ast current dose in 

setting of decent control and likelihood of d/C soon   





(4) Lactic acidosis


Code(s): E87.2 - ACIDOSIS   Status: Resolved   Comment: resolved   





(5) SIRS (systemic inflammatory response syndrome)


Code(s): R65.10 - SIRS OF NON-INFECTIOUS ORIGIN W/O ACUTE ORGAN DYSFUNCTION   

Status: Resolved   Comment: Improved, continue Vancomycin   





(6) Obesity (BMI 30-39.9)


Code(s): E66.9 - OBESITY, UNSPECIFIED   Status: Chronic   





- Plan


cont current plan of care, continue antibiotics, PT/OT





* .


would recommend trnasitoning to doxycycline 100mg po BOD with close follow up 





OR


Bactrim DS 2 tab po TID for weight and renal-based dosing.  would not use 

clinda because doses needed to treat bursitis higher than can be tolerated 

orally.  





favor bactrim over doxy





home when ortho ready

## 2018-11-27 ENCOUNTER — HOSPITAL ENCOUNTER (OUTPATIENT)
Dept: HOSPITAL 92 - LABBT | Age: 63
Discharge: HOME | End: 2018-11-27
Attending: SURGERY
Payer: COMMERCIAL

## 2018-11-27 DIAGNOSIS — K42.9: ICD-10-CM

## 2018-11-27 DIAGNOSIS — Z01.810: Primary | ICD-10-CM

## 2018-11-27 PROCEDURE — 93010 ELECTROCARDIOGRAM REPORT: CPT

## 2018-11-27 PROCEDURE — 93005 ELECTROCARDIOGRAM TRACING: CPT

## 2018-11-27 NOTE — EKG
Test Reason : 

Blood Pressure : ***/*** mmHG

Vent. Rate : 078 BPM     Atrial Rate : 078 BPM

   P-R Int : 220 ms          QRS Dur : 116 ms

    QT Int : 388 ms       P-R-T Axes : 057 100 032 degrees

   QTc Int : 442 ms

 

Sinus rhythm with 1st degree A-V block

Rightward axis

Low voltage QRS

Incomplete right bundle branch block

Borderline ECG

No previous ECGs available

Confirmed by SURESH ZAPATA, DR. TOM ([id number]) on 11/27/2018 1:18:14 PM

 

Referred By:  R[facility]           Confirmed By:DR. VAISHALI PRINCE MD

## 2018-11-27 NOTE — HP
HISTORY OF PRESENT ILLNESS:  Kaiden Alvarado is a 63-year-old male, morbidly obese, 42

BMI, has an umbilical hernia, for which I would recommend that he repair in January 2016, when I initially saw him.  He has procrastinated this and now reports

approximately the same weight with an enlarged umbilical hernia protruding.  Plan is

to repair that with robot using mesh.  __________ previously discussed, and he

understands risks and benefits.  We will proceed. 



PAST MEDICAL HISTORY:  Morbid obesity.



PAST SURGICAL HISTORY:  Incision and drainage of right elbow and knee infection,

skin and soft tissue. 



SOCIAL HISTORY:  Tobacco, none.  Alcohol, none.  The patient works at [employer].  He

is . 



ALLERGIES:  NONE.



REVIEW OF SYSTEMS:  Noncontributory.



FAMILY HISTORY:  Noncontributory.



MEDICATIONS:  

1. Flomax 0.4 mg daily.

2. Multivitamins daily.



PHYSICAL EXAMINATION:

VITAL SIGNS:  314 pounds, 72 inches, 42 BMI.  Blood pressure 157/85, heart rate 59,

temperature 98.4 degrees. 

HEAD, EYES, EARS, NOSE, AND THROAT:  Unremarkable. 

LUNGS:  Clear to auscultation. 

CARDIAC:  Regular rhythm without murmur or gallop. 

ABDOMEN:  Soft, obese, protuberant umbilical hernia __________ thin skin.  Morbid

obesity. 

EXTREMITIES:  Unremarkable.



ASSESSMENT:  Umbilical hernia.



PLAN:  Repair using mesh.  He understands risks and benefits, consent.  Ideally,

weight loss should be considered, but the skin is so thin that we will proceed. 







Job ID:  [id number]

## 2018-11-28 ENCOUNTER — HOSPITAL ENCOUNTER (OUTPATIENT)
Dept: HOSPITAL 92 - SDC | Age: 63
End: 2018-11-28
Attending: SURGERY
Payer: COMMERCIAL

## 2018-11-28 VITALS — BODY MASS INDEX: 39.8 KG/M2

## 2018-11-28 DIAGNOSIS — Z79.899: ICD-10-CM

## 2018-11-28 DIAGNOSIS — L98.7: ICD-10-CM

## 2018-11-28 DIAGNOSIS — K42.0: Primary | ICD-10-CM

## 2018-11-28 DIAGNOSIS — N52.9: ICD-10-CM

## 2018-11-28 DIAGNOSIS — E66.01: ICD-10-CM

## 2018-11-28 DIAGNOSIS — K43.6: ICD-10-CM

## 2018-11-28 DIAGNOSIS — Z88.5: ICD-10-CM

## 2018-11-28 PROCEDURE — 93005 ELECTROCARDIOGRAM TRACING: CPT

## 2018-11-28 PROCEDURE — 96374 THER/PROPH/DIAG INJ IV PUSH: CPT

## 2018-11-28 PROCEDURE — 0WQF4ZZ REPAIR ABDOMINAL WALL, PERCUTANEOUS ENDOSCOPIC APPROACH: ICD-10-PCS | Performed by: SURGERY

## 2018-11-28 PROCEDURE — 0WUF4JZ SUPPLEMENT ABDOMINAL WALL WITH SYNTHETIC SUBSTITUTE, PERCUTANEOUS ENDOSCOPIC APPROACH: ICD-10-PCS | Performed by: SURGERY

## 2018-11-28 PROCEDURE — 93010 ELECTROCARDIOGRAM REPORT: CPT

## 2018-11-28 PROCEDURE — 0HB7XZZ EXCISION OF ABDOMEN SKIN, EXTERNAL APPROACH: ICD-10-PCS | Performed by: SURGERY

## 2018-11-28 NOTE — OP
DATE OF PROCEDURE:  11/28/2018



PREOPERATIVE DIAGNOSES:  Very large incarcerated umbilical and ventral hernia (to

the right of the umbilicus, incarcerated omentum). 



PROCEDURES PERFORMED:  

1. Robotic decompression of incarcerated umbilical and ventral hernia.

2. Robotic laparoscopic closure of fascial defect with #1 V-Loc.

3. Reinforcement of fascial closure with 10 x 15 cm mesh, secured with 2-0 V-Loc.

4. Umbilicoplasty resecting the massively stretched out redundant umbilical skin

with layer closure of the defect. 



ANESTHESIA:  General with local of 0.5% Marcaine with epinephrine 30 mL.



DESCRIPTION OF PROCEDURE:  The patient was taken to the operating room.  Under

general anesthesia, Umana catheter was placed at the beginning and removed at the

end, although leaving 180 mL of saline in the bladder prior to removal.  Abdomen was

prepared with ChloraPrep and draped in routine fashion.  Bilateral __________

subcostal incision was made and pneumoperitoneum established to 15 mmHg with a

Veress needle and in this replaced with 8 mm ports placed on the other side under

laparoscopic visualization.  A left subxiphoid incision was made __________

falciform and the 11 mm port placed.  Balloon catheter held in place and the robot

was docked, positioned, and used to reduce the incarcerated umbilical and ventral

hernia.  This was full of omentum that was reduced using cautery for hemostasis.

Once the two herniated defects were decompressed of omentum, the hernia defects were

closed with continuous suture of #1 V-Loc.  The ventral hernia defect was just to

the patient's right of the umbilical hernia, approximately 3 cm with a fascial

bridge and continuous suture of #1 V-Loc used to approximate these fascial defects

transversely.  A 10 x 15 cm Ventralight mesh inserted and secured with

circumferential suture of 2-0 V-Loc after reducing pneumoperitoneum to 11 mmHg.

Once this repair was accomplished and all needles were removed and sutures removed,

we then turned into the massively redundant skin, which measured probably 20 x 15 cm

and protruded massively about the planar surface of the abdominal wall.  This was

excised transversely.  The resulting skin and subcutaneous defect were irrigated.

Hemostasis gained with a cautery and 

this wound approximated by approximating subcutaneous tissues with 3-0 Monocryl on

skin with subdermal 4-0 Monocryl and port sites were approximated with 4-0 Monocryl

subdermal.  There was a 9 cm skin defect closed in layer after the redundant skin

was removed.  Dermabond applied.  The patient tolerated the procedure well. 







Job ID:  [id number]

## 2021-12-13 NOTE — CON
DATE OF CONSULTATION:  10/23/2017

 

REASON FOR CONSULTATION:  Right elbow olecranon bursitis.

 

HISTORY OF PRESENT ILLNESS:  A 62-year-old who has no significant past medical history, sustained an
 injury to the right elbow with now inflammatory process, which required surgical debridement by Dr. Gray.  The procedure was carried out on 10/20/2017, basically an incision was made over the poste
rior arm extended into the olecranon bursa, the bursa was debrided with 10 mL of dense purulent exud
ate, then bursectomy was made.  Forearm subcutaneous fascia was opened and radiation performed and t
hen the wound packed, now the patient has a negative pressure dressing.  He denies any headaches, vi
sual symptoms, sore throat, odynophagia, dysphagia, no cough or sputum production or chest pain, no 
abdominal pain or diarrhea.  He is having some difficulty with passing urine at this time which he n
ever had before.  No other joint symptoms.  No neurological symptoms.

 

PAST MEDICAL HISTORY:  Hernia.

 

PAST SURGICAL HISTORY:  Negative otherwise.

 

SOCIAL HISTORY:  Never a smoker, does not drink.  Works at [employer] .

 

FAMILY HISTORY:  Noncontributory.

 

ALLERGIES:  Allergy history is negative.

 

CURRENT MEDICATIONS:  Include vancomycin, p.r.n. meds.

 

PHYSICAL EXAMINATION:

VITAL SIGNS:  Essentially normal except for mild elevation of systolic blood pressure.

GENERAL:  Appears in no distress.  

EXTREMITIES:  Right arm is dressed.  According to the wound care technician the wound base was red a
nd some granulation, there was some peeling of his skin around the wound.  No erythema, no purulence
.  No lymphadenopathy.

HEENT:  Ocular movements are conjugate.  Oral cavity with numerous teeth in place with quite a bit d
esiccation and decay and gum disease.

NECK:  Supple, no jugular venous distention.

LUNGS:  With symmetric clear breath sounds.

HEART:  S1, S2, no S3, S4, no murmurs.

ABDOMEN:  Soft.  Not distended or tender.  No ascites.  

:  No bladder distention.

EXTREMITIES:  No other joint inflammatory activity.

NEUROLOGIC:  Nonfocal.  Cognitive function appears to be normal.  

 

LABORATORY:  Sodium 139, creatinine 0.76.  CRP 2.89, which is down from admission.  Albumin 2.7, ariela
irubin 2.0.  Liver profile normal.  Vancomycin trough 27.7.  On biology with methicillin-resistant S
taphylococcus aureus which is susceptible to clindamycin, linezolid, Bactrim, tetracycline.  

 

Elbow x-ray from 10/19/2017 with a small olecranon space, no fracture or bony destruction or periost
eal reaction.

 

ASSESSMENT:  Olecranon bursitis secondary to methicillin-resistant Staphylococcus aureus, status pos
t surgical debridement.

 

DISCUSSION:  The patient does not appear to have been bacteremic and  continue current regimen, even
tually transition to oral clindamycin at least 300 mg 4 times daily for discharge planning.  Alterna
te regimens would include rifampin and Bactrim or rifampin and doxycycline.  Duration of therapy 2 w
eeks with continuation of wound care management. Consent: The risks of atrophy were reviewed with the patient.